# Patient Record
Sex: FEMALE | Race: WHITE | NOT HISPANIC OR LATINO | Employment: FULL TIME | URBAN - METROPOLITAN AREA
[De-identification: names, ages, dates, MRNs, and addresses within clinical notes are randomized per-mention and may not be internally consistent; named-entity substitution may affect disease eponyms.]

---

## 2017-01-12 ENCOUNTER — APPOINTMENT (OUTPATIENT)
Dept: PHYSICAL THERAPY | Facility: REHABILITATION | Age: 47
End: 2017-01-12
Payer: COMMERCIAL

## 2017-01-12 PROCEDURE — 97140 MANUAL THERAPY 1/> REGIONS: CPT

## 2017-01-12 PROCEDURE — 97014 ELECTRIC STIMULATION THERAPY: CPT

## 2017-01-13 ENCOUNTER — GENERIC CONVERSION - ENCOUNTER (OUTPATIENT)
Dept: OTHER | Facility: OTHER | Age: 47
End: 2017-01-13

## 2017-01-17 ENCOUNTER — ALLSCRIPTS OFFICE VISIT (OUTPATIENT)
Dept: OTHER | Facility: OTHER | Age: 47
End: 2017-01-17

## 2017-01-17 ENCOUNTER — TRANSCRIBE ORDERS (OUTPATIENT)
Dept: ADMINISTRATIVE | Facility: HOSPITAL | Age: 47
End: 2017-01-17

## 2017-01-17 DIAGNOSIS — R20.0 TACTILE ANESTHESIA: Primary | ICD-10-CM

## 2017-01-18 ENCOUNTER — OFFICE VISIT (OUTPATIENT)
Dept: RADIOLOGY | Facility: CLINIC | Age: 47
End: 2017-01-18
Payer: COMMERCIAL

## 2017-01-18 ENCOUNTER — ALLSCRIPTS OFFICE VISIT (OUTPATIENT)
Dept: OTHER | Facility: OTHER | Age: 47
End: 2017-01-18

## 2017-01-18 DIAGNOSIS — R20.0 TACTILE ANESTHESIA: ICD-10-CM

## 2017-01-18 PROCEDURE — 95886 MUSC TEST DONE W/N TEST COMP: CPT

## 2017-01-18 PROCEDURE — 95909 NRV CNDJ TST 5-6 STUDIES: CPT

## 2017-01-19 ENCOUNTER — APPOINTMENT (OUTPATIENT)
Dept: PHYSICAL THERAPY | Facility: REHABILITATION | Age: 47
End: 2017-01-19
Payer: COMMERCIAL

## 2017-01-19 PROCEDURE — 97110 THERAPEUTIC EXERCISES: CPT

## 2017-01-23 ENCOUNTER — APPOINTMENT (OUTPATIENT)
Dept: PHYSICAL THERAPY | Facility: REHABILITATION | Age: 47
End: 2017-01-23
Payer: COMMERCIAL

## 2017-01-23 PROCEDURE — 97110 THERAPEUTIC EXERCISES: CPT

## 2017-01-30 ENCOUNTER — APPOINTMENT (OUTPATIENT)
Dept: PHYSICAL THERAPY | Facility: REHABILITATION | Age: 47
End: 2017-01-30
Payer: COMMERCIAL

## 2017-01-30 PROCEDURE — 97110 THERAPEUTIC EXERCISES: CPT

## 2017-02-06 ENCOUNTER — APPOINTMENT (OUTPATIENT)
Dept: PHYSICAL THERAPY | Facility: REHABILITATION | Age: 47
End: 2017-02-06
Payer: COMMERCIAL

## 2017-02-06 PROCEDURE — 97110 THERAPEUTIC EXERCISES: CPT

## 2017-02-13 ENCOUNTER — APPOINTMENT (OUTPATIENT)
Dept: PHYSICAL THERAPY | Facility: REHABILITATION | Age: 47
End: 2017-02-13
Payer: COMMERCIAL

## 2017-02-13 ENCOUNTER — GENERIC CONVERSION - ENCOUNTER (OUTPATIENT)
Dept: OTHER | Facility: OTHER | Age: 47
End: 2017-02-13

## 2017-02-20 ENCOUNTER — APPOINTMENT (OUTPATIENT)
Dept: PHYSICAL THERAPY | Facility: REHABILITATION | Age: 47
End: 2017-02-20
Payer: COMMERCIAL

## 2017-02-20 PROCEDURE — 97140 MANUAL THERAPY 1/> REGIONS: CPT

## 2017-02-22 ENCOUNTER — GENERIC CONVERSION - ENCOUNTER (OUTPATIENT)
Dept: OTHER | Facility: OTHER | Age: 47
End: 2017-02-22

## 2017-03-16 ENCOUNTER — GENERIC CONVERSION - ENCOUNTER (OUTPATIENT)
Dept: OTHER | Facility: OTHER | Age: 47
End: 2017-03-16

## 2017-04-06 ENCOUNTER — HOSPITAL ENCOUNTER (OUTPATIENT)
Dept: MAMMOGRAPHY | Facility: MEDICAL CENTER | Age: 47
Discharge: HOME | End: 2017-04-06
Payer: COMMERCIAL

## 2017-04-06 DIAGNOSIS — Z12.31 ENCOUNTER FOR SCREENING MAMMOGRAM FOR MALIGNANT NEOPLASM OF BREAST: ICD-10-CM

## 2017-04-06 PROCEDURE — 77063 BREAST TOMOSYNTHESIS BI: CPT

## 2017-04-06 PROCEDURE — G0202 SCR MAMMO BI INCL CAD: HCPCS

## 2017-04-20 ENCOUNTER — APPOINTMENT (OUTPATIENT)
Dept: URGENT CARE | Facility: MEDICAL CENTER | Age: 47
End: 2017-04-20
Payer: OTHER MISCELLANEOUS

## 2017-04-20 PROCEDURE — G0382 LEV 3 HOSP TYPE B ED VISIT: HCPCS

## 2017-04-20 PROCEDURE — 99283 EMERGENCY DEPT VISIT LOW MDM: CPT

## 2017-05-02 ENCOUNTER — APPOINTMENT (OUTPATIENT)
Dept: URGENT CARE | Facility: MEDICAL CENTER | Age: 47
End: 2017-05-02
Payer: OTHER MISCELLANEOUS

## 2017-05-02 PROCEDURE — 99214 OFFICE O/P EST MOD 30 MIN: CPT

## 2017-05-09 ENCOUNTER — GENERIC CONVERSION - ENCOUNTER (OUTPATIENT)
Dept: OTHER | Facility: OTHER | Age: 47
End: 2017-05-09

## 2017-05-25 ENCOUNTER — ALLSCRIPTS OFFICE VISIT (OUTPATIENT)
Dept: OTHER | Facility: OTHER | Age: 47
End: 2017-05-25

## 2017-05-25 DIAGNOSIS — E78.5 HYPERLIPIDEMIA: ICD-10-CM

## 2017-05-25 DIAGNOSIS — Z00.00 ENCOUNTER FOR GENERAL ADULT MEDICAL EXAMINATION WITHOUT ABNORMAL FINDINGS: ICD-10-CM

## 2017-05-26 ENCOUNTER — GENERIC CONVERSION - ENCOUNTER (OUTPATIENT)
Dept: OTHER | Facility: OTHER | Age: 47
End: 2017-05-26

## 2017-08-01 ENCOUNTER — GENERIC CONVERSION - ENCOUNTER (OUTPATIENT)
Dept: OTHER | Facility: OTHER | Age: 47
End: 2017-08-01

## 2017-08-23 ENCOUNTER — GENERIC CONVERSION - ENCOUNTER (OUTPATIENT)
Dept: OTHER | Facility: OTHER | Age: 47
End: 2017-08-23

## 2017-09-12 ENCOUNTER — ALLSCRIPTS OFFICE VISIT (OUTPATIENT)
Dept: OTHER | Facility: OTHER | Age: 47
End: 2017-09-12

## 2017-10-25 ENCOUNTER — GENERIC CONVERSION - ENCOUNTER (OUTPATIENT)
Dept: OTHER | Facility: OTHER | Age: 47
End: 2017-10-25

## 2017-11-08 ENCOUNTER — GENERIC CONVERSION - ENCOUNTER (OUTPATIENT)
Dept: OTHER | Facility: OTHER | Age: 47
End: 2017-11-08

## 2018-01-02 ENCOUNTER — ALLSCRIPTS OFFICE VISIT (OUTPATIENT)
Dept: OTHER | Facility: OTHER | Age: 48
End: 2018-01-02

## 2018-01-03 NOTE — PROGRESS NOTES
Assessment   1  Acute sinusitis (461 9) (J01 90)   2  Cigarette smoker (305 1) (F17 210)    Plan   Acute sinusitis    · DrRx Zithromax Z-Benja 250 MG #6 pill pack; take as directed    Discussion/Summary      Trav Notice is here with symptoms of sinusitis for the past 2 weeks  We will treat with increased water drinking and Mucinex D during the day  She may continue was use of Tylenol and/or ibuprofen for discomfort  I have given her prescription for a Z-Benja  discussed need for smoking cessation and I offered help  She had done well with patches in the fall and hopefully she and her  will determine a quit date in the near future  She should contact us if her symptoms are not resolving  We will plan health maintenance visit in the spring  Chief Complaint   sinus congestion      History of Present Illness   HPI: Cold sx began 2 weeks ago  She started with cold sx with ST and nasal congestion  has tried allergy tabs, Delsym and Mucinex but nothing helps  mostly productive of yellow mucus of sinus pressure in AM nausea from PND   has had congestion but not as sick as she is  quit smoking in October for 2 weeks with patches  to 1 ppd            Review of Systems        Constitutional: feeling tired, but-- no fever,-- not feeling poorly-- and-- no chills  ENT: sore throat, but-- no earache  Cardiovascular: no complaints of slow or fast heart rate, no chest pain, no palpitations, no leg claudication or lower extremity edema  Respiratory: cough, but-- no shortness of breath-- and-- no wheezing  Active Problems   1  Aftercare following surgery (V58 89) (Z48 89)   2  Anxiety (300 00) (F41 9)   3  Cervical cancer screening (V76 2) (Z12 4)   4  Encounter for gynecological examination with abnormal finding (V72 31) (Z01 411)   5  Encounter for routine gynecological examination (V72 31) (Z01 419)   6   Encounter for routine gynecological examination with Papanicolaou smear of cervix (V72 31,V76 2) (Z01 419)   7  Flu vaccine need (V04 81) (Z23)   8  History of self breast exam   9  Hyperlipidemia (272 4) (E78 5)   10  Insomnia (780 52) (G47 00)   11  Irregular menstrual cycle (626 4) (N92 6)   12  Lateral epicondylitis of right elbow (726 32) (M77 11)   13  Medial epicondylitis (726 31) (M77 00)   14  History of Need for prophylactic vaccination and inoculation against influenza (V04 81)      (Z23)   15  Trochanteric bursitis of right hip (726 5) (M70 61)   16  Visit for screening mammogram (V76 12) (Z12 31)    Past Medical History   1  History of Acute cystitis with hematuria (595 0) (N30 01)   2  History of Acute upper respiratory infection (465 9) (J06 9)   3  History of Endometrial polyp (621 0) (N84 0)   4  History of  4 (V22 2) (Z33 1)   5  History of acute sinusitis (V12 69) (Z87 09)   6  History of acute sinusitis (V12 69) (Z87 09)   7  History of allergy (V15 09) (Z88 9)   8  History of depression (V11 8) (Z86 59)   9  History of ovarian cyst (V13 29) (Z87 42)   10  History of urinary tract infection (V13 02) (Z87 440)   11  History of uterine leiomyoma (V13 29) (Z86 018)   12  History of viral gastroenteritis (V12 09) (Z86 19)   13  History of Knee pain, bilateral (719 46) (M25 561,M25 562)   14  History of Need for prophylactic vaccination and inoculation against influenza (V04 81)      (Z23)   15  History of Numbness of arm (782 0) (R20 0)   16  History of Pain, upper back (724 5) (M54 9)   17  History of Previously Pregnant Including ___ Miscarriage(S)   18  History of Sprain of right foot, initial encounter (845 10) (S98 671A)   19  History of Tubal Pregnancy (633 10)  Active Problems And Past Medical History Reviewed: The active problems and past medical history were reviewed and updated today  Family History   Mother    1  Family history of hypertension (V17 49) (Z82 49)  Father    2  Family history of rheumatoid arthritis (V17 7) (Z82 61)  Sister    3   Family history of hypertension (V17 49) (Z82 49)  Maternal Grandmother    4  Family history of lung cancer (V16 1) (Z80 1)  Paternal Grandmother    11  Family history of malignant neoplasm of breast (V16 3) (Z80 3)  Maternal Aunt    6  Family history of malignant neoplasm (V16 9) (Z80 9)    Social History    · Always uses seat belt   · Being A Social Drinker   · rare   · Caffeine use (V49 89) (F15 90)   · Cigarette smoker (305 1) (F17 210)   · Denied: History of Drug use   ·    · No preference on Taoist beliefs   · Two children    Surgical History   1  History of Abdominal Surgery   2  History of Breast Surgery   3  History of  Section   4  History of Elbow Surgery   5  History of Eye Surgery   6  History of Hemorrhoidectomy   7  History of Incisional Breast Biopsy   8  History of Oral Surgery Tooth Extraction    Current Meds    1  Eszopiclone 2 MG Oral Tablet; TAKE 1 TABLET AT BEDTIME AS NEEDED FOR SLEEP; Therapy: 2012 to (Ewa Peppers)  Requested for: 40UGQ9214; Last     Rx:2017 Ordered   2  LORazepam 1 MG Oral Tablet; Take 1 tablet daily; Therapy: 78LQY5635 to (Evaluate:68Qan8252)  Requested for: 2016; Last     Rx:2016 Ordered   3  Multi-Vitamin Daily Oral Tablet; Therapy: (Recorded:2014) to Recorded   4  Nicotine 21 MG/24HR Transdermal Patch 24 Hour; APPLY 1 PATCH DAILY AS     DIRECTED; Therapy: 83WGO6286 to (88 649 24 60)  Requested for: 33MJV0544; Last     Rx:2017 Ordered     The medication list was reviewed and updated today  Allergies   1  Augmentin TABS   2  PHENobarbital TABS    Vitals    Recorded: 2018 02:34PM Recorded: 12PZJ8144 02:11PM   Temperature  94 1 F   Systolic 150    Diastolic 80    Height  5 ft 1 5 in   Weight  161 lb 6 oz   BMI Calculated  30   BSA Calculated  1 73     Physical Exam        Constitutional      General appearance: No acute distress, well appearing and well nourished  overweight        Ears, Nose, Mouth, and Throat      External inspection of ears and nose: Normal        Otoscopic examination: Tympanic membranes translucent with normal light reflex  Canals patent without erythema  Nasal mucosa, septum, and turbinates: Abnormal   The bilateral nasal mucosa was boggy  Oropharynx: Normal with no erythema, edema, exudate or lesions  Pulmonary      Respiratory effort: Abnormal   Respiratory Findings: dry cough  Auscultation of lungs: Clear to auscultation  Cardiovascular      Auscultation of heart: Normal rate and rhythm, normal S1 and S2, without murmurs            Signatures    Electronically signed by : Dahlia Mascorro MD; Jan 2 2018  3:18PM EST                       (Author)

## 2018-01-09 NOTE — MISCELLANEOUS
Message   Recorded as Task   Date: 09/28/2016 10:12 AM, Created By: Laly Holcomb   Task Name: Miscellaneous   Assigned To: Linda Wilkins   Regarding Patient: Shey Samuels, Status: In Progress   Comment:    Eric Yuen - 28 Sep 2016 10:12 AM     TASK CREATED  Labs are normal, please inform the patient  Recommend proceed with plan as scheduled   Stephanie Mason - 28 Sep 2016 10:32 AM     TASK IN PROGRESS        Active Problems    1  Anxiety (300 00) (F41 9)   2  Cervical cancer screening (V76 2) (Z12 4)   3  Depression (311) (F32 9)   4  Encounter for gynecological examination with abnormal finding (V72 31) (Z01 411)   5  Encounter for routine gynecological examination with Papanicolaou smear of cervix   (V72 31,V76 2) (Z01 419)   6  Endometrial polyp (621 0) (N84 0)   7  Fibroids (218 9) (D25 9)   8  Flu vaccine need (V04 81) (Z23)   9  History of self breast exam   10  Insomnia (780 52) (G47 00)   11  Irregular menstrual cycle (626 4) (N92 6)   12  Lateral epicondylitis of right elbow (726 32) (M77 11)   13  Medial epicondylitis (726 31) (M77 00)   14  History of Need for prophylactic vaccination and inoculation against influenza (V04 81)    (Z23)   15  Visit for screening mammogram (V76 12) (Z12 31)    Current Meds   1  Eszopiclone 3 MG Oral Tablet (Lunesta); TAKE 1 TABLET AT BEDTIME; Therapy: 02Apr2012 to (Renew:20Nov2016)  Requested for: 20Jei5154; Last   Rx:02Iir8354 Ordered   2  LORazepam 1 MG Oral Tablet; Take 1 tablet daily; Therapy: 45PZV5125 to (Evaluate:92Jks5277)  Requested for: 19Apr2016; Last   Rx:19Apr2016 Ordered   3  Multi-Vitamin Daily Oral Tablet; Therapy: (Recorded:05Jun2014) to Recorded   4  Naproxen 500 MG Oral Tablet; TAKE 1 TABLET EVERY 12 HOURS WITH FOOD AS   NEEDED; Therapy: 20OZX9723 to (Suraj Aguilar)  Requested for: 03Skc0910; Last   Rx:44Muw9085 Ordered   5  Nicotine 21 MG/24HR Transdermal Patch 24 Hour; APPLY 1 PATCH DAILY AS   DIRECTED;    Therapy: 33TBP8224 to (Evaluate:36Guq0516)  Requested for: 60YUH0607; Last   Rx:41Nbw2607 Ordered   6  Norethindrone Acetate 5 MG Oral Tablet; 1 tablet BID; Therapy: 99FQR4710 to (Evaluate:60Xvr3325)  Requested for: 47NQJ6171; Last   Rx:40Dvj5015 Ordered    Allergies    1  Augmentin TABS   2   PHENobarbital TABS    Signatures   Electronically signed by : Bryan Carmichael, ; Sep 28 2016  1:18PM EST                       (Author)

## 2018-01-10 NOTE — RESULT NOTES
Verified Results  (Q) PAIN MANAGEMENT PROFILE 1 W/ CONFIRMATION, URINE 59OQR2720 12:00AM Brittanie Myriam     Test Name Result Flag Reference   Prescribed Drug 1 Lorazepam     Creatinine 21 5 mg/dL  > or = 20 0   pH 6 07  4 5 - 9 0   Oxidant NEGATIVE mcg/mL  <200   Amphetamines NEGATIVE ng/mL  <500   medMATCH Amphetamines CONSISTENT     Barbiturates NEGATIVE ng/mL  <300   medMATCH Barbiturates CONSISTENT     Benzodiazepines NEGATIVE ng/mL  <100   medMATCH Benzodiazepines INCONSISTENT     Marijuana Metabolite NEGATIVE ng/mL  <20   medMATCH Marijuana Metab CONSISTENT     Cocaine Metabolite NEGATIVE ng/mL  <150   medMATCH Cocaine Metab CONSISTENT     Methadone NEGATIVE ng/mL  <100   medMATCH Methadone CONSISTENT     Opiates NEGATIVE ng/mL  <100   medMATCH Opiates CONSISTENT     Oxycodone NEGATIVE ng/mL  <100   medMATCH Oxycodone CONSISTENT     Phencyclidine NEGATIVE ng/mL  <25   medMATCH Phencyclidine CONSISTENT     This drug testing is for medical treatment only  Analysis was performed as non-forensic testing and   these results should be used only by healthcare   providers to render diagnosis or treatment, or to   monitor progress of medical conditions  [facility] comments are:   - present when drug test results may be the result of      metabolism of one or more drugs or when results are      inconsistent with prescribed medication(s) listed  - may be blank when drug results are consistent with      prescribed medication(s) listed  For assistance with interpreting these drug results,   please contact a [facility]   Specialist: [phone] ([phone]), M-F,   8am-6pm EST

## 2018-01-12 NOTE — MISCELLANEOUS
Message   Recorded as Task   Date: 02/08/2016 01:59 PM, Created By: Angi Barrios   Task Name: Follow Up   Assigned To: Stephanie Mason   Regarding Patient: Ciera Aguilera, Status: Active   Comment:    Angi Barrios - 08 Feb 2016 1:59 PM     TASK CREATED  pt is asking for a 3 month supply through the mail away for the aygestin    I didn't know if this was going to be a long term med for her    Yarelis Spine - 08 Feb 2016 3:35 PM     TASK REPLIED TO: Previously Assigned To [facility]  if it is working, she can continue for another 3 months and then we can re evaluate her  Active Problems    1  Acute sinusitis (461 9) (J01 90)   2  Acute upper respiratory infection (465 9) (J06 9)   3  Anxiety (300 00) (F41 9)   4  Cervical cancer screening (V76 2) (Z12 4)   5  Encounter for gynecological examination with abnormal finding (V72 31) (Z01 411)   6  Fibroids (218 9) (D25 9)   7  History of self breast exam   8  Insomnia (780 52) (G47 00)   9  Irregular menstrual cycle (626 4) (N92 6)   10  History of Need for prophylactic vaccination and inoculation against influenza (V04 81)    (Z23)   11  Tobacco use (305 1) (Z72 0)   12  Visit for screening mammogram (V76 12) (Z12 31)    Current Meds   1  Eszopiclone 3 MG Oral Tablet (Lunesta); TAKE 1 TABLET AT BEDTIME; Therapy: 02Apr2012 to (Marine Rm)  Requested for: 82ZTI6092; Last   Rx:57Iif8908 Ordered   2  Levofloxacin 500 MG Oral Tablet; Take 1 pill by mouth daily; Therapy: 57JZV5194 to (Evaluate:33Zsb2278)  Requested for: 26TUU3777; Last   Rx:60Rqh5690 Ordered   3  LORazepam 1 MG Oral Tablet; Take 1 tablet daily; Therapy: 37GYX2269 to (454 5656)  Requested for: 29KDR1244; Last   Rx:10Bwl7555 Ordered   4  Multi-Vitamin Daily Oral Tablet; Therapy: (Recorded:78Ahf3954) to Recorded   5  Norethindrone Acetate 5 MG Oral Tablet; Take 1 tablet daily; Therapy: 54OYE1362 to (Evaluate:51Kcc1688)  Requested for: 42KAO8400;  Last   Rx:32Kss1044 Ordered    Allergies    1  Augmentin TABS   2   PHENobarbital TABS    Plan  Irregular menstrual cycle    · From  Norethindrone Acetate 5 MG Oral Tablet Take 1 tablet daily To  Norethindrone Acetate 5 MG Oral Tablet TAKE AS DIRECTED    Signatures   Electronically signed by : Anastasiia Granados, ; Feb 8 2016  4:23PM EST                       (Author)

## 2018-01-12 NOTE — MISCELLANEOUS
Message   Recorded as Task   Date: 05/04/2017 04:43 PM, Created By: Karen Encarnacion   Task Name: Call Back   Assigned To: Stacy Mclaughlin,Team   Regarding Patient: Flavia Cleveland, Status: In Progress   Comment:    Tasha Rice - 04 May 2017 4:43 PM     TASK CREATED  Caller: Self; [phone] (Home); [phone] (Work)  She would like a referral to go see Dr Taco Ignacio about her ongoing elbow pain  OK to issue? She will also need ins referral for [facility] whe appt is scheduled  Magdy Thompsonfany - 08 May 2017 6:51 PM     TASK REPLIED TO: Previously Assigned To [facility] can refer her to Dr Taco Ignacio; however, if she needs to get a referral, she will need to come into the office to be seen since it has been a while since she was seen here  Tasha Rice - 08 May 2017 6:57 PM     TASK EDITED  lmovm for pt to call back  Needs referral for [facility] secondary ins  So needs appt here  Tasha Rcie - 08 May 2017 6:57 PM     TASK IN PROGRESS   Sandy Seymour - 09 May 2017 9:23 AM     TASK EDITED  Appt 5/25/17  Active Problems    1  Aftercare following surgery (V58 89) (Z48 89)   2  Anxiety (300 00) (F41 9)   3  Cervical cancer screening (V76 2) (Z12 4)   4  Depression (311) (F32 9)   5  Encounter for gynecological examination with abnormal finding (V72 31) (Z01 411)   6  Encounter for routine gynecological examination with Papanicolaou smear of cervix   (V72 31,V76 2) (Z01 419)   7  Fibroids (218 9) (D25 9)   8  Flu vaccine need (V04 81) (Z23)   9  History of self breast exam   10  Insomnia (780 52) (G47 00)   11  Irregular menstrual cycle (626 4) (N92 6)   12  Lateral epicondylitis of right elbow (726 32) (M77 11)   13  Medial epicondylitis (726 31) (M77 00)   14  History of Need for prophylactic vaccination and inoculation against influenza (V04 81)    (Z23)   15  Numbness of arm (782 0) (R20 0)   16  Visit for screening mammogram (V76 12) (Z12 31)    Current Meds   1   Eszopiclone 3 MG Oral Tablet (Lunesta); TAKE 1 TABLET AT BEDTIME; Therapy: 02Apr2012 to (Mery Torres)  Requested for: 25TYZ5644; Last   Rx:27Jan2017 Ordered   2  LORazepam 1 MG Oral Tablet; Take 1 tablet daily; Therapy: 44TIP5223 to (Evaluate:09Eyf3309)  Requested for: 19Apr2016; Last   Rx:19Apr2016 Ordered   3  Multi-Vitamin Daily Oral Tablet; Therapy: (Recorded:05Jun2014) to Recorded   4  Naproxen 500 MG Oral Tablet Recorded   5  Nicotine 21 MG/24HR Transdermal Patch 24 Hour; APPLY 1 PATCH DAILY AS   DIRECTED; Therapy: 58JAP0483 to (Evaluate:10Aug2016)  Requested for: 86DQV0602; Last   Rx:37Yfc0093 Ordered   6  Norethindrone Acetate 5 MG Oral Tablet; TAKE AS DIRECTED; Therapy: 97UFA9938 to (Evaluate:12Nov2017)  Requested for: 79OUF4713; Last   Rx:17Nov2016 Ordered   7  Percocet  MG Oral Tablet (Oxycodone-Acetaminophen) Recorded   8  Promethazine HCl - 12 5 MG Oral Tablet Recorded    Allergies    1  Augmentin TABS   2   PHENobarbital TABS    Signatures   Electronically signed by : Lance Laurent, ; May  9 2017  9:23AM EST                       (Author)

## 2018-01-13 VITALS
HEIGHT: 62 IN | SYSTOLIC BLOOD PRESSURE: 128 MMHG | HEART RATE: 86 BPM | BODY MASS INDEX: 28.96 KG/M2 | WEIGHT: 157.38 LBS | DIASTOLIC BLOOD PRESSURE: 84 MMHG

## 2018-01-13 VITALS
SYSTOLIC BLOOD PRESSURE: 120 MMHG | WEIGHT: 151.25 LBS | HEIGHT: 62 IN | DIASTOLIC BLOOD PRESSURE: 78 MMHG | BODY MASS INDEX: 27.83 KG/M2

## 2018-01-15 VITALS
SYSTOLIC BLOOD PRESSURE: 116 MMHG | WEIGHT: 153 LBS | TEMPERATURE: 96.5 F | BODY MASS INDEX: 28.16 KG/M2 | DIASTOLIC BLOOD PRESSURE: 90 MMHG | HEART RATE: 80 BPM | HEIGHT: 62 IN

## 2018-01-16 NOTE — MISCELLANEOUS
Message   Recorded as Task   Date: 10/02/2016 05:54 PM, Created By: Andres Giron   Task Name: Go to Result   Assigned To: Karmen Abdullahi   Regarding Patient: Nimesh Menard, Status: In Progress   CommentCurtistine Estimable - 02 Oct 2016 5:54 PM     TASK CREATED  endo biopsy normal, please inform the pt  Proceed with hysteroscopy D&C as scheduled   Eden Hdz - 03 Oct 2016 8:22 AM     TASK IN PROGRESS   Pt informed  Active Problems    1  Anxiety (300 00) (F41 9)   2  Cervical cancer screening (V76 2) (Z12 4)   3  Depression (311) (F32 9)   4  Encounter for gynecological examination with abnormal finding (V72 31) (Z01 411)   5  Encounter for routine gynecological examination with Papanicolaou smear of cervix   (V72 31,V76 2) (Z01 419)   6  Endometrial polyp (621 0) (N84 0)   7  Fibroids (218 9) (D25 9)   8  Flu vaccine need (V04 81) (Z23)   9  History of self breast exam   10  Insomnia (780 52) (G47 00)   11  Irregular menstrual cycle (626 4) (N92 6)   12  Lateral epicondylitis of right elbow (726 32) (M77 11)   13  Medial epicondylitis (726 31) (M77 00)   14  History of Need for prophylactic vaccination and inoculation against influenza (V04 81)    (Z23)   15  Visit for screening mammogram (V76 12) (Z12 31)    Current Meds   1  Eszopiclone 3 MG Oral Tablet (Lunesta); TAKE 1 TABLET AT BEDTIME; Therapy: 02Apr2012 to (Renew:20Nov2016)  Requested for: 21Bpd8436; Last   Rx:93Ykp1285 Ordered   2  LORazepam 1 MG Oral Tablet; Take 1 tablet daily; Therapy: 36YLA0949 to (Evaluate:95Yrd5765)  Requested for: 19Apr2016; Last   Rx:19Apr2016 Ordered   3  Multi-Vitamin Daily Oral Tablet; Therapy: (Recorded:05Jun2014) to Recorded   4  Naproxen 500 MG Oral Tablet; TAKE 1 TABLET EVERY 12 HOURS WITH FOOD AS   NEEDED; Therapy: 35VZB2244 to (Dar Ernst)  Requested for: 08Sep2016; Last   Rx:21Kbr4035 Ordered   5   Nicotine 21 MG/24HR Transdermal Patch 24 Hour; APPLY 1 PATCH DAILY AS   DIRECTED; Therapy: 68HVS0870 to (Evaluate:75Wpo6738)  Requested for: 36TRE0616; Last   Rx:48Ozy7896 Ordered   6  Norethindrone Acetate 5 MG Oral Tablet; 1 tablet BID; Therapy: 12HWT9273 to (Evaluate:88Iiq6271)  Requested for: 49PBF4902; Last   Rx:06Wea3841 Ordered    Allergies    1  Augmentin TABS   2   PHENobarbital TABS    Signatures   Electronically signed by : Micha Rhoades, ; Oct  7 2016 11:46AM EST                       (Author)

## 2018-01-17 NOTE — MISCELLANEOUS
Message   Date: 17 Nov 2016 9:45 AM EST, Recorded By: Stella Leroy For: [place]: Keri Barahona, Self   Phone: [phone] [facility]), [phone] (Work)   Reason: Renew Medication   Patient called for Rx refill, escript sent to [employer]  Active Problems    1  Aftercare following surgery (V58 89) (Z48 89)   2  Anxiety (300 00) (F41 9)   3  Cervical cancer screening (V76 2) (Z12 4)   4  Depression (311) (F32 9)   5  Encounter for gynecological examination with abnormal finding (V72 31) (Z01 411)   6  Encounter for routine gynecological examination with Papanicolaou smear of cervix   (V72 31,V76 2) (Z01 419)   7  Endometrial polyp (621 0) (N84 0)   8  Fibroids (218 9) (D25 9)   9  Flu vaccine need (V04 81) (Z23)   10  History of self breast exam   11  Insomnia (780 52) (G47 00)   12  Irregular menstrual cycle (626 4) (N92 6)   13  Lateral epicondylitis of right elbow (726 32) (M77 11)   14  Medial epicondylitis (726 31) (M77 00)   15  History of Need for prophylactic vaccination and inoculation against influenza (V04 81)    (Z23)   16  Visit for screening mammogram (V76 12) (Z12 31)    Current Meds   1  Eszopiclone 3 MG Oral Tablet (Lunesta); TAKE 1 TABLET AT BEDTIME; Therapy: 02Apr2012 to (Renew:98Rmv0073)  Requested for: 45RXG5983; Last   Rx:14Nov2016 Ordered   2  LORazepam 1 MG Oral Tablet; Take 1 tablet daily; Therapy: 46PFU8295 to (Evaluate:42Fjr7683)  Requested for: 19Apr2016; Last   Rx:19Apr2016 Ordered   3  Multi-Vitamin Daily Oral Tablet; Therapy: (Recorded:05Jun2014) to Recorded   4  Naproxen 500 MG Oral Tablet Recorded   5  Nicotine 21 MG/24HR Transdermal Patch 24 Hour; APPLY 1 PATCH DAILY AS   DIRECTED; Therapy: 05GDU4583 to (Evaluate:10Aug2016)  Requested for: 72BSV5156; Last   Rx:67Usm2406 Ordered   6  Percocet  MG Oral Tablet (Oxycodone-Acetaminophen) Recorded   7  Promethazine HCl - 12 5 MG Oral Tablet Recorded    Allergies    1  Augmentin TABS   2   PHENobarbital TABS    Signatures   Electronically signed by : Chhaya London, ; Nov 17 2016  9:47AM EST                       (Author)

## 2018-01-17 NOTE — PROGRESS NOTES
Assessment    1  Cigarette smoker (305 1) (F17 210)   2  Encounter for preventive health examination (V70 0) (Z00 00)   3  Anxiety (300 00) (F41 9)   4  Insomnia (780 52) (G47 00)   5  Lateral epicondylitis of right elbow (726 32) (M77 11)   6  Hyperlipidemia (272 4) (E78 5)   7  Trochanteric bursitis of right hip (726 5) (M70 61)    Plan  Anxiety, Insomnia    · (Q) PAIN MANAGEMENT PROFILE 1 W/ CONFIRMATION, URINE; Status:Active; Requested for:93Yen3206;   Drug(s): : Lunesta, lorazepam  Health Maintenance, Hyperlipidemia    · (1) LIPID PANEL FASTING W DIRECT LDL REFLEX; Status:Active; Requested  for:01Zyd3207;   Hyperlipidemia    · (1) CBC/PLT/DIFF; Status:Active; Requested for:15Cyw9220;    · (1) COMPREHENSIVE METABOLIC PANEL; Status:Active; Requested for:47Yai1358;    · (1) TSH WITH FT4 REFLEX; Status:Active; Requested for:90Osy7006; Insomnia    · Eszopiclone 2 MG Oral Tablet; TAKE 1 TABLET AT BEDTIME AS NEEDED FOR  SLEEP  Lateral epicondylitis of right elbow    · 2 - Arsalan Pruitt MD, Allison Pineda  (Orthopedic Surgery) Co-Management  *  Status: Hold For -  Scheduling  Requested for: 98VCY9699  Care Summary provided  : Yes  Need for Tdap vaccination    · Adacel 5-2-15 5 LF-MCG/0 5 Intramuscular Suspension  SocHx: Cigarette smoker, Health Maintenance    · You need to quit smoking ; Status:Complete;   Done: 81FOU9263  Trochanteric bursitis of right hip    · Kenalog 40 MG/ML Injection Suspension   · Lidocaine HCl - 2 % Injection Solution   · Joint Bursa Aspiration &/or Injection Major - POC; Status:Complete;   Done: 95QBM3748    Discussion/Summary  health maintenance visit healthy adult female Currently, she eats an adequate diet and has an adequate exercise regimen  cervical cancer screening is current Breast cancer screening: mammogram is current  Colorectal cancer screening: colorectal cancer screening is not indicated  Osteoporosis screening: bone mineral density testing is not indicated   Screening lab work includes labs as above  The immunizations will be given as outlined in the orders  Advice and education were given regarding tobacco cessation  Patient discussion: discussed with the patient  1  Anxiety - the patient will continue with lorazepam as needed  2  Insomnia - she will continue with the Lunesta at bedtime as needed but she will decrease to the 2 mg strength  She signed a contract, urine sample was obtained, and PDMP was checked  3  Lateral epicondylitis of the right elbow - the patient will continue with the occupational therapy treatments at work  She reports getting kinesiology tape applied which seems to be helpful  She was given a referral to see Dr Geovanna Thomas whom she has an appointment with in August 4  Trochanteric bursitis of the right hip - unimproved with oral anti-inflammatories - the patient was given a corticosteroid injection as above  5  Hyperlipidemia - reassess with labs as above  Chief Complaint  Physical   Pt c/o right elbow pain  History of Present Illness  HM, Adult Female: The patient is being seen for a health maintenance evaluation  General Health: The patient's health since the last visit is described as good  She has regular dental visits  She complains of vision problems  Vision care includes wearing glasses, wearing soft contact lenses and having eye examinations 1 times per year  She denies hearing loss  Immunizations status: not up to date  Lifestyle:  She consumes a diverse and healthy diet  (well-balanced and drinks water)   She exercises regularly  (walks about 3 times a week for about 30-60 minutes )   She uses tobacco  Tobacco Use Duration: 1 cigarette pack(s) per day  She denies alcohol use  She denies drug use  Reproductive health:  she reports normal menses  Screening: Cervical cancer screening includes a pap smear performed September 2016  Breast cancer screening includes a mammogram performed April 2017   Colorectal cancer screening includes no previous colonoscopy  Safety elements used: no seat belt, no sunscreen, no smoke detector and no carbon monoxide detector  Risk findings: no domestic violence  HPI: notes that she went to Ortho and had surgery on the right elbow - went back to work end of January but it started bothering her again mid April - works typing - talked to employer but they 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 it again - notes that she had a WC    notes that she has been having right leg pain - thinks it is trochanteric bursitis - can hurt laying on that side or standing - takes a lot of Advil but still bothering her - has tried stretches and walking more    randomly gets stressed - takes Ativan monthly on average    takes Lunesta for sleep - has not had it the last month since she ran out but got 2-3 wakenings a night      Review of Systems    Constitutional: no fever and no chills  Cardiovascular: no chest pain and no palpitations  Respiratory: no shortness of breath, no cough and no wheezing  Gastrointestinal: no nausea, no vomiting and no diarrhea  Genitourinary: no dysuria  Musculoskeletal: no myalgias    The patient presents with complaints of arthralgias (right hip and elbow)  Integumentary: no rashes and no skin lesions  Neurological: no dizziness and no fainting    The patient presents with complaints of headache (thinks she had a migraine a few months ago - thinks her allergies cause her HAs usually - occurs about once a month and resolves with Advil)  Psychiatric: anxiety, but no depression  Hematologic/Lymphatic: no tendency for easy bleeding and no tendency for easy bruising  Active Problems    1  Aftercare following surgery (V58 89) (Z48 89)   2  Anxiety (300 00) (F41 9)   3  Cervical cancer screening (V76 2) (Z12 4)   4  Encounter for gynecological examination with abnormal finding (V72 31) (Z01 411)   5  Encounter for routine gynecological examination with Papanicolaou smear of cervix   (V72 31,V76 2) (Z01 419)   6   Flu vaccine need (V04 81) (Z23)   7  History of self breast exam   8  Insomnia (780 52) (G47 00)   9  Irregular menstrual cycle (626 4) (N92 6)   10  Lateral epicondylitis of right elbow (726 32) (M77 11)   11  Medial epicondylitis (726 31) (M77 00)   12  History of Need for prophylactic vaccination and inoculation against influenza (V04 81)    (Z23)   13   Visit for screening mammogram (V76 12) (Z12 31)    Past Medical History    · History of Acute cystitis with hematuria (595 0) (N30 01)   · History of Acute upper respiratory infection (465 9) (J06 9)   · History of Endometrial polyp (621 0) (N84 0)   · History of  4 (V22 2) (Z33 1)   · History of acute sinusitis (V12 69) (Z87 09)   · History of acute sinusitis (V12 69) (Z87 09)   · History of allergy (V15 09) (Z88 9)   · History of depression (V11 8) (Z86 59)   · History of ovarian cyst (V13 29) (Z87 42)   · History of urinary tract infection (V13 02) (Z87 440)   · History of uterine leiomyoma (V13 29) (Z86 018)   · History of viral gastroenteritis (V12 09) (Z86 19)   · History of Knee pain, bilateral (719 46) (M25 561,M25 562)   · History of Need for prophylactic vaccination and inoculation against influenza (V04 81)  (Z23)   · History of Numbness of arm (782 0) (R20 0)   · History of Pain, upper back (724 5) (M54 9)   · History of Previously Pregnant Including ___ Miscarriage(S)   · History of Sprain of right foot, initial encounter (845 10) (S98 831A)   · History of Tubal Pregnancy (633 10)    Surgical History    · History of Abdominal Surgery   · History of Breast Surgery   · History of  Section   · History of Elbow Surgery   · History of Eye Surgery   · History of Hemorrhoidectomy   · History of Incisional Breast Biopsy   · History of Oral Surgery Tooth Extraction    Family History  Mother    · Family history of hypertension (V17 49) (Z82 49)  Father    · Family history of rheumatoid arthritis (V17 7) (Z82 61)  Sister    · Family history of hypertension (V17 49) (Z82 49)  Maternal Grandmother    · Family history of lung cancer (V16 1) (Z80 1)  Paternal Grandmother    · Family history of malignant neoplasm of breast (V16 3) (Z80 3)  Maternal Aunt    · Family history of malignant neoplasm (V16 9) (Z80 9)    Social History    · Always uses seat belt   · Being A Social Drinker   · rare   · Caffeine use (V49 89) (F15 90)   · Cigarette smoker (305 1) (F17 210)   · Denied: History of Drug use   ·    · No preference on Judaism beliefs   · Two children    Current Meds   1  Eszopiclone 3 MG Oral Tablet; TAKE 1 TABLET AT BEDTIME; Therapy: 02Apr2012 to (Patel Benoit)  Requested for: 44EOO6923; Last   Rx:27Jan2017 Ordered   2  LORazepam 1 MG Oral Tablet; Take 1 tablet daily; Therapy: 35TKQ8870 to (Evaluate:61Jho3201)  Requested for: 19Apr2016; Last   Rx:19Apr2016 Ordered   3  Multi-Vitamin Daily Oral Tablet; Therapy: (Recorded:05Jun2014) to Recorded   4  Naproxen 500 MG Oral Tablet Recorded   5  Nicotine 21 MG/24HR Transdermal Patch 24 Hour; APPLY 1 PATCH DAILY AS   DIRECTED; Therapy: 04VOK5954 to (Evaluate:10Aug2016)  Requested for: 57GBA5135; Last   Rx:79Smb9827 Ordered    Allergies    1  Augmentin TABS   2  PHENobarbital TABS    Vitals   Recorded: 02OPH2928 06:10PM Recorded: 03BRP5870 05:17PM   Temperature  96 5 F   Heart Rate  80   Systolic 436,    Diastolic 90, LUE 90   Height  5 ft 1 5 in   Weight  153 lb    BMI Calculated  28 44   BSA Calculated  1 7     Physical Exam    Constitutional   General appearance: No acute distress, well appearing and well nourished  Head and Face   Head and face: Normal     Eyes   Conjunctiva and lids: No swelling, erythema or discharge  Pupils and irises: Equal, round, reactive to light  Ears, Nose, Mouth, and Throat   External inspection of ears and nose: Normal     Otoscopic examination: Tympanic membranes translucent with normal light reflex  Canals patent without erythema      Hearing: Normal     Nasal mucosa, septum, and turbinates: Normal without edema or erythema  Lips, teeth, and gums: Normal, good dentition  Oropharynx: Normal with no erythema, edema, exudate or lesions  Neck   Neck: Supple, symmetric, trachea midline, no masses  Thyroid: Normal, no thyromegaly  Pulmonary   Respiratory effort: No increased work of breathing or signs of respiratory distress  Auscultation of lungs: Clear to auscultation  Cardiovascular   Auscultation of heart: Normal rate and rhythm, normal S1 and S2, no murmurs  Peripheral vascular exam: Normal   Carotid: no bruit on the right and no bruit on the left  Radial: right 2+ and left 2+  Posterior tibialis: right 2+ and left 2+  Examination of extremities for edema and/or varicosities: Normal   no edema  Abdomen   Abdomen: Non-tender, no masses  Liver and spleen: No hepatomegaly or splenomegaly  Lymphatic   Palpation of lymph nodes in neck: No lymphadenopathy  Musculoskeletal   Gait and station: Normal     Joints, bones, and muscles: Abnormal   Tenderness to palpation over the lateral right hip without any erythema/warmth/swelling/ecchymosis noted, no tenderness to palpation of the right elbow but pain in the lateral right elbow with resisted pronation  Range of motion: Normal     Muscle strength/tone: Normal   Motor Strength Findings: normal upper extremity strength and normal lower extremity strength  Skin   Skin and subcutaneous tissue: Normal without rashes or lesions  Neurologic   Sensation: No sensory loss  Sensory exam: intact to light touch  Psychiatric   Mood and affect: Normal        Results/Data  PHQ-9 Adult Depression Screening 53OJP8447 05:28PM User, Tangerine Powers     Test Name Result Flag Reference   PHQ-9 Adult Depression Score 3     Over the last two weeks, how often have you been bothered by any of the following problems?   Little interest or pleasure in doing things: Not at all - 0  Feeling down, depressed, or hopeless: Not at all - 0  Trouble falling or staying asleep, or sleeping too much: More than half the days - 2  Feeling tired or having little energy: Several days - 1  Poor appetite or over eating: Not at all - 0  Feeling bad about yourself - or that you are a failure or have let yourself or your family down: Not at all - 0  Trouble concentrating on things, such as reading the newspaper or watching television: Not at all - 0  Moving or speaking so slowly that other people could have noticed   Or the opposite -  being so fidgety or restless that you have been moving around a lot more than usual: Not at all - 0  Thoughts that you would be better off dead, or of hurting yourself in some way: Not at all - 0   PHQ-9 Adult Depression Screening Negative     PHQ-9 Difficulty Level Not difficult at all     PHQ-9 Severity Minimal Depression         Future Appointments    Date/Time Provider Specialty Site   11/27/2017 05:40 PM Eric Anderson, [facility]   Electronically signed by : Liliane Burgos, [facility]; May 25 2017  6:38PM EST                       (Author)    Electronically signed by : SHO Rodriguez ; May 26 2017  4:42PM EST

## 2018-01-23 VITALS
BODY MASS INDEX: 29.7 KG/M2 | SYSTOLIC BLOOD PRESSURE: 118 MMHG | WEIGHT: 161.38 LBS | TEMPERATURE: 98.8 F | HEIGHT: 62 IN | DIASTOLIC BLOOD PRESSURE: 80 MMHG

## 2018-04-06 DIAGNOSIS — Z12.31 ENCOUNTER FOR SCREENING MAMMOGRAM FOR MALIGNANT NEOPLASM OF BREAST: ICD-10-CM

## 2018-04-11 ENCOUNTER — TELEPHONE (OUTPATIENT)
Dept: OBGYN CLINIC | Facility: CLINIC | Age: 48
End: 2018-04-11

## 2018-04-19 DIAGNOSIS — G47.00 INSOMNIA, UNSPECIFIED TYPE: Primary | ICD-10-CM

## 2018-04-19 RX ORDER — ESZOPICLONE 3 MG/1
3 TABLET, FILM COATED ORAL
Qty: 90 TABLET | Refills: 0 | Status: SHIPPED | OUTPATIENT
Start: 2018-04-19 | End: 2018-07-16 | Stop reason: SDUPTHER

## 2018-07-06 ENCOUNTER — HOSPITAL ENCOUNTER (OUTPATIENT)
Dept: MAMMOGRAPHY | Facility: MEDICAL CENTER | Age: 48
Discharge: HOME | End: 2018-07-06
Payer: COMMERCIAL

## 2018-07-06 DIAGNOSIS — Z12.31 ENCOUNTER FOR SCREENING MAMMOGRAM FOR MALIGNANT NEOPLASM OF BREAST: ICD-10-CM

## 2018-07-06 PROCEDURE — 77067 SCR MAMMO BI INCL CAD: CPT

## 2018-07-06 PROCEDURE — 77063 BREAST TOMOSYNTHESIS BI: CPT

## 2018-07-10 ENCOUNTER — OFFICE VISIT (OUTPATIENT)
Dept: FAMILY MEDICINE CLINIC | Facility: CLINIC | Age: 48
End: 2018-07-10
Payer: COMMERCIAL

## 2018-07-10 VITALS
HEART RATE: 77 BPM | OXYGEN SATURATION: 97 % | HEIGHT: 61 IN | RESPIRATION RATE: 16 BRPM | TEMPERATURE: 98 F | BODY MASS INDEX: 28.66 KG/M2 | DIASTOLIC BLOOD PRESSURE: 72 MMHG | WEIGHT: 151.8 LBS | SYSTOLIC BLOOD PRESSURE: 110 MMHG

## 2018-07-10 DIAGNOSIS — E78.5 HYPERLIPIDEMIA, UNSPECIFIED HYPERLIPIDEMIA TYPE: ICD-10-CM

## 2018-07-10 DIAGNOSIS — Z00.00 ENCOUNTER FOR HEALTH MAINTENANCE EXAMINATION IN ADULT: Primary | ICD-10-CM

## 2018-07-10 DIAGNOSIS — R20.2 NOTALGIA PARESTHETICA: ICD-10-CM

## 2018-07-10 DIAGNOSIS — Z12.11 SCREENING FOR COLON CANCER: ICD-10-CM

## 2018-07-10 DIAGNOSIS — F17.200 CURRENT EVERY DAY SMOKER: ICD-10-CM

## 2018-07-10 DIAGNOSIS — G47.00 INSOMNIA, UNSPECIFIED TYPE: ICD-10-CM

## 2018-07-10 PROCEDURE — 99396 PREV VISIT EST AGE 40-64: CPT | Performed by: FAMILY MEDICINE

## 2018-07-10 RX ORDER — NAPROXEN 500 MG/1
TABLET ORAL
Refills: 0 | COMMUNITY
Start: 2018-06-29 | End: 2018-09-26

## 2018-07-10 RX ORDER — METHOCARBAMOL 500 MG/1
500 TABLET, FILM COATED ORAL 3 TIMES DAILY
COMMUNITY
Start: 2018-06-29 | End: 2018-09-26

## 2018-07-10 NOTE — PROGRESS NOTES
Assessment/Plan:    Hyperlipidemia  Have given her a lab slip to check fasting lipid profile and comprehensive metabolic profile  Notalgia paresthetica  I will give her a prescription to obtain a TENS unit  Also recommended use of capsaicin cream     Insomnia  She uses Lunesta nightly which works well  Current every day smoker  Urged patient to quit and offered help  She would like to try patches again  Diagnoses and all orders for this visit:    Encounter for health maintenance examination in adult    Screening for colon cancer    Hyperlipidemia, unspecified hyperlipidemia type    Notalgia paresthetica    Insomnia, unspecified type    Current every day smoker    Other orders  -     methocarbamol (ROBAXIN) 500 mg tablet; Take 500 mg by mouth Three times a day  -     naproxen (NAPROSYN) 500 mg tablet; TAKE 1 TABLET (500 MG TOTAL) BY MOUTH 2 (TWO) TIMES A DAY WITH MEALS FOR 15 DAYS  TAKE WITH MEALS  Subjective:      Patient ID: Agustín Lam is a 50 y o  female  She is here for health maintenance physical exam   She eats healthy diet  Exercises at home with yoga, weights, aerobic  UTD with dentist  UTD with mamm and gyn  Smoking 1 ppd  Works for [employer] service  Lives with  and 15 yr old daughter        The following portions of the patient's history were reviewed and updated as appropriate: allergies, current medications, past family history, past medical history, past social history, past surgical history and problem list     Review of Systems   Constitutional: Negative for activity change, chills, fatigue and fever  HENT: Negative for congestion, ear pain, sinus pressure and sore throat  Eyes: Negative for pain and visual disturbance  Respiratory: Negative for cough, chest tightness, shortness of breath and wheezing  Cardiovascular: Negative for chest pain, palpitations and leg swelling     Gastrointestinal: Negative for abdominal pain, blood in stool, constipation, diarrhea, nausea and vomiting  Endocrine: Negative for polydipsia and polyuria  Genitourinary: Negative for difficulty urinating, dysuria, frequency and urgency  Musculoskeletal: Positive for myalgias  Negative for arthralgias and joint swelling  Spasms upper back  Taking Robaxin from urgent care   Skin: Negative for rash  Neurological: Negative for dizziness, weakness, numbness and headaches  Hematological: Negative for adenopathy  Does not bruise/bleed easily  Psychiatric/Behavioral: Negative for dysphoric mood  The patient is not nervous/anxious  Objective:      /72 (BP Location: Left arm, Patient Position: Sitting, Cuff Size: Standard)   Pulse 77   Temp 98 °F (36 7 °C) (Tympanic)   Resp 16   Ht 5' 1" (1 549 m)   Wt 68 9 kg (151 lb 12 8 oz)   SpO2 97%   BMI 28 68 kg/m²          Physical Exam   Constitutional: She is oriented to person, place, and time  She appears well-developed and well-nourished  HENT:   Head: Normocephalic and atraumatic  Right Ear: External ear normal    Left Ear: External ear normal    Mouth/Throat: Oropharynx is clear and moist    Neck: Normal range of motion  Neck supple  Cardiovascular: Normal rate, regular rhythm and normal heart sounds  Pulmonary/Chest: Effort normal and breath sounds normal    Abdominal: Soft  She exhibits no distension and no mass  There is no tenderness  Musculoskeletal: Normal range of motion  Neurological: She is alert and oriented to person, place, and time  She has normal reflexes  Skin: Skin is warm and dry  The hyperpigmented patch left infra scapular region  Psychiatric: She has a normal mood and affect  Her behavior is normal    Nursing note and vitals reviewed

## 2018-07-16 DIAGNOSIS — G47.00 INSOMNIA, UNSPECIFIED TYPE: ICD-10-CM

## 2018-07-16 RX ORDER — ESZOPICLONE 3 MG/1
3 TABLET, FILM COATED ORAL
Qty: 90 TABLET | Refills: 0 | Status: SHIPPED | OUTPATIENT
Start: 2018-07-16 | End: 2018-07-27 | Stop reason: SDUPTHER

## 2018-07-27 DIAGNOSIS — G47.00 INSOMNIA, UNSPECIFIED TYPE: ICD-10-CM

## 2018-07-27 RX ORDER — ESZOPICLONE 3 MG/1
3 TABLET, FILM COATED ORAL
Qty: 90 TABLET | Refills: 0 | Status: SHIPPED | OUTPATIENT
Start: 2018-07-27 | End: 2019-02-28 | Stop reason: SDUPTHER

## 2018-07-27 NOTE — TELEPHONE ENCOUNTER
SINDY for patient to call office back regarding her prescription    Patient called back and asked that [facility] prescription of Lunesta 3 mg be cancelled and be ordered via [facility]  I called [facility] pharmacy [id number] [place] ([phone]) and spoke to [facility], who deleted Lunesta prescription      Prescription will be ordered via [facility]

## 2018-09-18 ENCOUNTER — OFFICE VISIT (OUTPATIENT)
Dept: FAMILY MEDICINE CLINIC | Facility: CLINIC | Age: 48
End: 2018-09-18
Payer: COMMERCIAL

## 2018-09-18 VITALS
HEART RATE: 82 BPM | WEIGHT: 152.13 LBS | RESPIRATION RATE: 18 BRPM | TEMPERATURE: 97.4 F | HEIGHT: 61 IN | SYSTOLIC BLOOD PRESSURE: 102 MMHG | DIASTOLIC BLOOD PRESSURE: 84 MMHG | BODY MASS INDEX: 28.72 KG/M2 | OXYGEN SATURATION: 97 %

## 2018-09-18 DIAGNOSIS — J01.00 ACUTE MAXILLARY SINUSITIS, RECURRENCE NOT SPECIFIED: Primary | ICD-10-CM

## 2018-09-18 PROCEDURE — 99213 OFFICE O/P EST LOW 20 MIN: CPT | Performed by: PHYSICIAN ASSISTANT

## 2018-09-18 RX ORDER — DOXYCYCLINE 100 MG/1
100 TABLET ORAL 2 TIMES DAILY
Qty: 20 TABLET | Refills: 0 | Status: SHIPPED | OUTPATIENT
Start: 2018-09-18 | End: 2018-09-21

## 2018-09-18 RX ORDER — FLUTICASONE PROPIONATE 50 MCG
2 SPRAY, SUSPENSION (ML) NASAL DAILY
Qty: 16 G | Refills: 0 | Status: SHIPPED | OUTPATIENT
Start: 2018-09-18 | End: 2019-01-09

## 2018-09-18 RX ORDER — BENZONATATE 100 MG/1
100-200 CAPSULE ORAL 3 TIMES DAILY PRN
Qty: 30 CAPSULE | Refills: 0 | Status: SHIPPED | OUTPATIENT
Start: 2018-09-18 | End: 2018-09-26

## 2018-09-18 NOTE — LETTER
September 18, 2018     Patient: Von Laguna   YOB: 1970   Date of Visit: 9/18/2018       To Whom it May Concern:    Von Laguna is under my professional care  She was seen in my office on 9/18/2018  Kera Kapadia She may return to work on 09/19/2018  Please excuse her absence from 09/14/2018- 09/18/2018    If you have any questions or concerns, please don't hesitate to call           Sincerely,          Casey Thomas PA-C        CC: No Recipients

## 2018-09-18 NOTE — PATIENT INSTRUCTIONS
I reviewed with the patient that does appear that she has a an acute sinus infection  She was started on doxycycline 100 milligrams twice daily as she has had her symptoms for a week without any improvement yet  She was additionally encouraged use Flonase 2 sprays per nostril daily  She was given benzonatate to use as needed for cough suppression  She was encouraged to have good fluid intake and rest   She should call if her symptoms are worsening or failing to improve over the next few days

## 2018-09-18 NOTE — PROGRESS NOTES
[facility]  FAMILY PRACTICE ACUTE OFFICE VISIT       NAME: Judy Martinez  AGE: 50 y o  SEX: female       : 1970        MRN: [id number]    DATE: 2018  TIME: 10:52 AM    Assessment and Plan     Problem List Items Addressed This Visit     None      Visit Diagnoses     Acute maxillary sinusitis, recurrence not specified    -  Primary    Relevant Medications    fluticasone (FLONASE) 50 mcg/act nasal spray    doxycycline (ADOXA) 100 MG tablet    benzonatate (TESSALON PERLES) 100 mg capsule        Patient Instructions    I reviewed with the patient that does appear that she has a an acute sinus infection  She was started on doxycycline 100 milligrams twice daily as she has had her symptoms for a week without any improvement yet  She was additionally encouraged use Flonase 2 sprays per nostril daily  She was given benzonatate to use as needed for cough suppression  She was encouraged to have good fluid intake and rest   She should call if her symptoms are worsening or failing to improve over the next few days  Chief Complaint     Chief Complaint   Patient presents with    URI     chest congestion, coughing, possible fever for a week        History of Present Illness   Judy Martinez is a 50y o -year-old female who presents for cold symptoms  URI    This is a new (notes that her daughter had a day of congestion the week prior and now  sick with same symptoms) problem  Episode onset: about 1 week ago  The problem has been unchanged  Maximum temperature: possible fever but not checked  Associated symptoms include congestion, coughing (productive), diarrhea, headaches, rhinorrhea, a sore throat (began with that last week) and wheezing  Pertinent negatives include no ear pain (but has a lot of crackling), nausea or vomiting  Treatments tried: decongestant and Tylenol and then changed to ibuprofen and Theraflu and loratadine  The treatment provided mild relief  Review of Systems   Review of Systems   Constitutional: Positive for appetite change (decreased)  Negative for fever  HENT: Positive for congestion, postnasal drip, rhinorrhea, sinus pressure and sore throat (began with that last week)  Negative for ear pain (but has a lot of crackling)  Respiratory: Positive for cough (productive) and wheezing  Negative for chest tightness and shortness of breath  Gastrointestinal: Positive for diarrhea  Negative for nausea and vomiting  Neurological: Positive for light-headedness and headaches  Active Problem List     Patient Active Problem List   Diagnosis    Anxiety    Hyperlipidemia    Insomnia    Lateral epicondylitis of right elbow    Notalgia paresthetica    Current every day smoker         Social History:  Social History     Social History    Marital status: /Civil Union     Spouse name: N/A    Number of children: N/A    Years of education: N/A     Occupational History    Not on file  Social History Main Topics    Smoking status: Current Every Day Smoker     Packs/day: 1 00     Years: 4 00    Smokeless tobacco: Never Used      Comment: quit for 20yrs but started smoking again 4yr ago    Alcohol use Yes      Comment: 1x week    Drug use: No    Sexual activity: Not on file     Other Topics Concern    Not on file     Social History Narrative    No narrative on file       Objective     Vitals:    09/18/18 1008   BP: 102/84   BP Location: Left arm   Patient Position: Sitting   Cuff Size: Standard   Pulse: 82   Resp: 18   Temp: (!) 97 4 °F (36 3 °C)   SpO2: 97%   Weight: 69 kg (152 lb 2 oz)   Height: 5' 1" (1 549 m)     Wt Readings from Last 3 Encounters:   09/18/18 69 kg (152 lb 2 oz)   07/10/18 68 9 kg (151 lb 12 8 oz)   01/02/18 73 2 kg (161 lb 6 1 oz)       Physical Exam   Constitutional: She appears well-developed and well-nourished  No distress  HENT:   Head: Normocephalic and atraumatic     Right Ear: Tympanic membrane, external ear and ear canal normal    Left Ear: Tympanic membrane, external ear and ear canal normal    Nose: Mucosal edema (severe bilaterally) present  Right sinus exhibits maxillary sinus tenderness  Right sinus exhibits no frontal sinus tenderness  Left sinus exhibits no maxillary sinus tenderness and no frontal sinus tenderness  Neck: Neck supple  No thyromegaly present  Cardiovascular: Normal rate, regular rhythm, normal heart sounds and intact distal pulses  No murmur heard  Pulmonary/Chest: Effort normal and breath sounds normal  She has no wheezes  She has no rales  Frequent dry cough in exam   Lymphadenopathy:     She has no cervical adenopathy  Psychiatric: She has a normal mood and affect  Her behavior is normal    Vitals reviewed  ALLERGIES:  Allergies   Allergen Reactions    Augmentin [Amoxicillin-Pot Clavulanate] GI Intolerance     md harris's use of cefazolin for 0R on 10/17/16 ( Dr Juju Guzman)   [facility] Phenobarbital Other (See Comments)     hallucinations       Current Medications     Current Outpatient Prescriptions   Medication Sig Dispense Refill    acetaminophen (TYLENOL) 500 mg tablet Take 500-1,000 mg by mouth every 6 (six) hours as needed for mild pain        eszopiclone (LUNESTA) tablet Take 1 tablet (3 mg total) by mouth daily at bedtime Take immediately before bedtime 90 tablet 0    LORazepam (ATIVAN) 1 mg tablet Take 0 5 mg by mouth daily as needed        methocarbamol (ROBAXIN) 500 mg tablet Take 500 mg by mouth Three times a day      Multiple Vitamin (MULTI VITAMIN DAILY PO) Take 3 tablets by mouth daily gummies       naproxen (NAPROSYN) 500 mg tablet TAKE 1 TABLET (500 MG TOTAL) BY MOUTH 2 (TWO) TIMES A DAY WITH MEALS FOR 15 DAYS   TAKE WITH MEALS   0    benzonatate (TESSALON PERLES) 100 mg capsule Take 1-2 capsules (100-200 mg total) by mouth 3 (three) times a day as needed for cough 30 capsule 0    doxycycline (ADOXA) 100 MG tablet Take 1 tablet (100 mg total) by mouth 2 (two) times a day for 10 days 20 tablet 0    fluticasone (FLONASE) 50 mcg/act nasal spray 2 sprays into each nostril daily 16 g 0     No current facility-administered medications for this visit            Natacha Alberto PA-C  9/18/2018 10:52 AM  [facility]

## 2018-09-18 NOTE — LETTER
September 18, 2018     Patient: Russ Pierre   YOB: 1970   Date of Visit: 9/18/2018       To Whom it May Concern:    Russ Pierre is under my professional care  She was seen in my office on 9/18/2018  She may return to work on 9/19/18  Please excuse her absence from from 9/14-18/18  If you have any questions or concerns, please don't hesitate to call           Sincerely,          Radha Mariee PA-C        CC: No Recipients

## 2018-09-20 ENCOUNTER — TELEPHONE (OUTPATIENT)
Dept: FAMILY MEDICINE CLINIC | Facility: CLINIC | Age: 48
End: 2018-09-20

## 2018-09-20 DIAGNOSIS — J01.00 ACUTE MAXILLARY SINUSITIS, RECURRENCE NOT SPECIFIED: Primary | ICD-10-CM

## 2018-09-20 DIAGNOSIS — J01.90 ACUTE SINUSITIS, RECURRENCE NOT SPECIFIED, UNSPECIFIED LOCATION: ICD-10-CM

## 2018-09-20 RX ORDER — PROMETHAZINE HYDROCHLORIDE AND CODEINE PHOSPHATE 6.25; 1 MG/5ML; MG/5ML
5 SYRUP ORAL EVERY 4 HOURS PRN
Qty: 120 ML | Refills: 0 | Status: SHIPPED | OUTPATIENT
Start: 2018-09-20 | End: 2018-09-26 | Stop reason: SDUPTHER

## 2018-09-20 NOTE — TELEPHONE ENCOUNTER
Patient call today, c/o coughing is not getting better, Benzonatate 100 mg medication  is helping during day time but not at bedtime  She would like to know if you can prescribe cough syrup phenergan  With codeine  Please advise  PDM checked, is all ok, she is only getting Lunesta

## 2018-09-21 RX ORDER — LEVOFLOXACIN 500 MG/1
500 TABLET, FILM COATED ORAL EVERY 24 HOURS
Qty: 7 TABLET | Refills: 0 | Status: SHIPPED | OUTPATIENT
Start: 2018-09-21 | End: 2018-09-28

## 2018-09-21 NOTE — TELEPHONE ENCOUNTER
Spoke with patient,  She received her cough syrup, and she is aware that she should not be taking this medication with lunesta  Pt still feeling sick she thinks the antibiotic is not working, she mention that you were going to order something different, she would like to know if you can sent a new antibiotic for her

## 2018-09-26 ENCOUNTER — OFFICE VISIT (OUTPATIENT)
Dept: FAMILY MEDICINE CLINIC | Facility: CLINIC | Age: 48
End: 2018-09-26
Payer: COMMERCIAL

## 2018-09-26 VITALS
TEMPERATURE: 98.3 F | HEART RATE: 83 BPM | OXYGEN SATURATION: 95 % | SYSTOLIC BLOOD PRESSURE: 100 MMHG | DIASTOLIC BLOOD PRESSURE: 80 MMHG | WEIGHT: 153.2 LBS | BODY MASS INDEX: 28.92 KG/M2 | HEIGHT: 61 IN

## 2018-09-26 DIAGNOSIS — F17.200 CURRENT EVERY DAY SMOKER: ICD-10-CM

## 2018-09-26 DIAGNOSIS — J06.9 UPPER RESPIRATORY TRACT INFECTION, UNSPECIFIED TYPE: Primary | ICD-10-CM

## 2018-09-26 DIAGNOSIS — J01.00 ACUTE MAXILLARY SINUSITIS, RECURRENCE NOT SPECIFIED: ICD-10-CM

## 2018-09-26 DIAGNOSIS — F41.9 ANXIETY: ICD-10-CM

## 2018-09-26 PROCEDURE — 3008F BODY MASS INDEX DOCD: CPT | Performed by: FAMILY MEDICINE

## 2018-09-26 PROCEDURE — 99214 OFFICE O/P EST MOD 30 MIN: CPT | Performed by: FAMILY MEDICINE

## 2018-09-26 RX ORDER — LORAZEPAM 1 MG/1
0.5 TABLET ORAL DAILY PRN
Qty: 90 TABLET | Refills: 0 | Status: SHIPPED | OUTPATIENT
Start: 2018-09-26

## 2018-09-26 RX ORDER — PROMETHAZINE HYDROCHLORIDE AND CODEINE PHOSPHATE 6.25; 1 MG/5ML; MG/5ML
5 SYRUP ORAL EVERY 4 HOURS PRN
Qty: 120 ML | Refills: 0 | Status: SHIPPED | OUTPATIENT
Start: 2018-09-26 | End: 2019-01-09

## 2018-09-26 NOTE — PROGRESS NOTES
Assessment/Plan:     I believe that the symptoms are related to viral infection, not bacterial sinusitis since she had no improvement with 2 different antibiotics  I reassured her that her lungs are completely clear  I recommended symptomatic treatment with increased water intake along with Mucinex D in the morning  I refilled her prescription for Phenergan with codeine for nighttime coughing  I urged her to try to quit smoking completely  She should contact us if symptoms do not resolve  Anxiety  She manages anxiety with occasional use of lorazepam 0 5 mg   was checked and she was given a 90 day prescription for her mail-order plan  Current every day smoker  Urged her to quit smoking and offered help  Diagnoses and all orders for this visit:    Upper respiratory tract infection, unspecified type    Acute maxillary sinusitis, recurrence not specified  -     promethazine-codeine (PHENERGAN WITH CODEINE) 6 25-10 mg/5 mL syrup; Take 5 mL by mouth every 4 (four) hours as needed for cough    Anxiety    Current every day smoker          Subjective:      Patient ID: Juan Francisco Veloz is a 50 y o  female  She developed cold symptoms and cough about 2 weeks ago  She was started on doxycycline but felt no better so antibiotic was changed to Levaquin  She has 2 more days of Levaquin but still does not feel like she is recovering  She had low-grade fever at the very beginning but then this resolved  She has nasal congestion, postnasal drainage and cough   She does not feel like the Mucinex has been helpful anymore  The promethazine with codeine did help her to sleep  She is leaving on a trip to [place] for a friend's wedding in 2 days  Sometimes the cough is productive of yellowish are clear mucus  Occasionally the cough is dry  Unfortunately she is still smoking, but not as much because of the symptoms  Cough   The current episode started 1 to 4 weeks ago  The problem has been unchanged  Associated symptoms include headaches  Pertinent negatives include no chills, ear pain, fever or sore throat  The following portions of the patient's history were reviewed and updated as appropriate: allergies, current medications, past family history, past medical history, past social history, past surgical history and problem list     Review of Systems   Constitutional: Positive for fatigue  Negative for chills and fever  HENT: Positive for congestion  Negative for ear pain and sore throat  Respiratory: Positive for cough  Gastrointestinal: Positive for diarrhea and nausea  Neurological: Positive for headaches  Objective:      /80 (BP Location: Left arm, Patient Position: Sitting, Cuff Size: Standard)   Pulse 83   Temp 98 3 °F (36 8 °C) (Tympanic)   Ht 5' 1" (1 549 m)   Wt 69 5 kg (153 lb 3 2 oz)   SpO2 95%   BMI 28 95 kg/m²          Physical Exam   Constitutional: She is oriented to person, place, and time  She appears well-developed and well-nourished  HENT:   Head: Normocephalic and atraumatic  Mouth/Throat: Oropharynx is clear and moist    Nose with boggy mucous membranes and clear rhinorrhea   Neck: Normal range of motion  Neck supple  No thyromegaly present  Cardiovascular: Normal rate, regular rhythm and normal heart sounds  Pulmonary/Chest: Effort normal and breath sounds normal    Lymphadenopathy:     She has no cervical adenopathy  Neurological: She is alert and oriented to person, place, and time  Skin: Skin is warm and dry  Nursing note and vitals reviewed

## 2018-09-26 NOTE — ASSESSMENT & PLAN NOTE
She manages anxiety with occasional use of lorazepam 0 5 mg   was checked and she was given a 90 day prescription for her mail-order plan

## 2018-09-26 NOTE — LETTER
September 26, 2018     Patient: Lazara Willard   YOB: 1970   Date of Visit: 9/26/2018       To Whom it May Concern:    Lazara Willard is under my professional care  She was seen in my office on 9/26/2018 for illness which began 9/14/18  Patient is unable to attend work on 9/26/18 and 9/27/18 due to persistent illness  She may return to work on 9/28/18  If you have any questions or concerns, please don't hesitate to call           Sincerely,          Esther Chowdhury MD        CC: No Recipients

## 2019-01-09 ENCOUNTER — OFFICE VISIT (OUTPATIENT)
Dept: FAMILY MEDICINE CLINIC | Facility: CLINIC | Age: 49
End: 2019-01-09
Payer: COMMERCIAL

## 2019-01-09 VITALS
DIASTOLIC BLOOD PRESSURE: 80 MMHG | HEART RATE: 75 BPM | HEIGHT: 61 IN | TEMPERATURE: 97.6 F | WEIGHT: 160 LBS | SYSTOLIC BLOOD PRESSURE: 114 MMHG | BODY MASS INDEX: 30.21 KG/M2 | OXYGEN SATURATION: 95 % | RESPIRATION RATE: 14 BRPM

## 2019-01-09 DIAGNOSIS — H93.13 TINNITUS OF BOTH EARS: ICD-10-CM

## 2019-01-09 DIAGNOSIS — H65.03 BILATERAL ACUTE SEROUS OTITIS MEDIA, RECURRENCE NOT SPECIFIED: Primary | ICD-10-CM

## 2019-01-09 PROCEDURE — 99213 OFFICE O/P EST LOW 20 MIN: CPT | Performed by: FAMILY MEDICINE

## 2019-01-09 PROCEDURE — 3008F BODY MASS INDEX DOCD: CPT | Performed by: FAMILY MEDICINE

## 2019-01-09 NOTE — PROGRESS NOTES
Assessment/Plan:    She appears to have serous otitis media and tenderness  We will treat with Mucinex D and increased hydration with water  Also recommended use of Flonase nasal spray, 2 sprays each nostril daily  She should contact us if symptoms are not improving by next week  Diagnoses and all orders for this visit:    Bilateral acute serous otitis media, recurrence not specified    Tinnitus of both ears          Subjective:      Patient ID: Marty Gifford is a 46 y o  female  Headaches across forehead and tinnitus for 2 weeks  She has felt a lot of nasal congestion, but does not think she had upper respiratory infection that started the symptoms  She has been taking Mucinex and she feels like it is helping it to drain, but the headaches and ringing are still present  She denies fever or chills  She has been functioning and going to work  She denies ear pain  She had 2 episodes of dizziness recently but not true vertigo          The following portions of the patient's history were reviewed and updated as appropriate: allergies, current medications, past family history, past medical history, past social history, past surgical history and problem list     Review of Systems   Constitutional: Positive for fatigue  Negative for chills and fever  HENT: Positive for congestion and tinnitus  Negative for ear pain, hearing loss and sore throat  Respiratory: Negative for cough  Gastrointestinal: Negative for abdominal pain, constipation, diarrhea and nausea  Neurological: Positive for dizziness and headaches  Objective:      /80   Pulse 75   Temp 97 6 °F (36 4 °C) (Tympanic)   Resp 14   Ht 5' 1" (1 549 m)   Wt 72 6 kg (160 lb)   SpO2 95%   BMI 30 23 kg/m²          Physical Exam   Constitutional: She is oriented to person, place, and time  She appears well-developed and well-nourished  HENT:   Head: Normocephalic and atraumatic     Mouth/Throat: Oropharynx is clear and moist  Nose with mucosal edema  TMs slightly dull     Neck: Normal range of motion  Neck supple  No thyromegaly present  Cardiovascular: Normal rate and regular rhythm  Pulmonary/Chest: Effort normal and breath sounds normal    Lymphadenopathy:     She has no cervical adenopathy  Neurological: She is alert and oriented to person, place, and time  Skin: Skin is warm and dry  Psychiatric: She has a normal mood and affect  Her behavior is normal    Nursing note and vitals reviewed

## 2019-01-15 DIAGNOSIS — Z12.11 ENCOUNTER FOR SCREENING COLONOSCOPY: Primary | ICD-10-CM

## 2019-02-28 DIAGNOSIS — G47.00 INSOMNIA, UNSPECIFIED TYPE: ICD-10-CM

## 2019-02-28 RX ORDER — ESZOPICLONE 3 MG/1
3 TABLET, FILM COATED ORAL
Qty: 90 TABLET | Refills: 0 | Status: SHIPPED | OUTPATIENT
Start: 2019-02-28 | End: 2019-07-17 | Stop reason: SDUPTHER

## 2019-07-17 DIAGNOSIS — G47.00 INSOMNIA, UNSPECIFIED TYPE: ICD-10-CM

## 2019-07-17 RX ORDER — ESZOPICLONE 3 MG/1
3 TABLET, FILM COATED ORAL
Qty: 90 TABLET | Refills: 0 | Status: SHIPPED | OUTPATIENT
Start: 2019-07-17 | End: 2019-11-20 | Stop reason: SDUPTHER

## 2019-10-17 ENCOUNTER — ANNUAL EXAM (OUTPATIENT)
Dept: OBGYN CLINIC | Facility: CLINIC | Age: 49
End: 2019-10-17
Payer: COMMERCIAL

## 2019-10-17 VITALS
BODY MASS INDEX: 32.85 KG/M2 | WEIGHT: 174 LBS | HEIGHT: 61 IN | SYSTOLIC BLOOD PRESSURE: 134 MMHG | DIASTOLIC BLOOD PRESSURE: 90 MMHG

## 2019-10-17 DIAGNOSIS — Z12.31 ENCOUNTER FOR SCREENING MAMMOGRAM FOR MALIGNANT NEOPLASM OF BREAST: ICD-10-CM

## 2019-10-17 DIAGNOSIS — Z12.4 SCREENING FOR CERVICAL CANCER: Primary | ICD-10-CM

## 2019-10-17 DIAGNOSIS — Z01.419 WOMEN'S ANNUAL ROUTINE GYNECOLOGICAL EXAMINATION: ICD-10-CM

## 2019-10-17 DIAGNOSIS — D21.9 FIBROID: ICD-10-CM

## 2019-10-17 DIAGNOSIS — Z11.51 SCREENING FOR HPV (HUMAN PAPILLOMAVIRUS): ICD-10-CM

## 2019-10-17 PROBLEM — N84.0 ENDOMETRIAL POLYP: Status: ACTIVE | Noted: 2019-10-17

## 2019-10-17 PROBLEM — N92.6 IRREGULAR BLEEDING: Status: ACTIVE | Noted: 2019-10-17

## 2019-10-17 PROBLEM — N84.0 ENDOMETRIAL POLYP: Status: RESOLVED | Noted: 2019-10-17 | Resolved: 2019-10-17

## 2019-10-17 PROCEDURE — 99396 PREV VISIT EST AGE 40-64: CPT | Performed by: OBSTETRICS & GYNECOLOGY

## 2019-10-17 PROCEDURE — G0145 SCR C/V CYTO,THINLAYER,RESCR: HCPCS | Performed by: OBSTETRICS & GYNECOLOGY

## 2019-10-17 PROCEDURE — 87624 HPV HI-RISK TYP POOLED RSLT: CPT | Performed by: OBSTETRICS & GYNECOLOGY

## 2019-10-17 NOTE — PATIENT INSTRUCTIONS
Uterine Fibroids   WHAT YOU NEED TO KNOW:   What are uterine fibroids? Uterine fibroids are growths found inside your uterus (womb)  Uterine fibroids also may be called tumors (lumps) or leiomyomas  Uterine fibroids often appear in groups, or you may have only one  They can be small or large, and they can grow in size  They are almost always benign (not cancer) and likely will not spread to other parts of your body  What increases my risk of getting uterine fibroids? The cause of uterine fibroids is not clear  Ask your healthcare provider about these and other risk factors for uterine fibroids:  · Heredity:  Your risk for uterine fibroids may increase if a close family member also has fibroids  · Hormone imbalance:  Hormones are chemicals that affect your growth  Too many hormones may cause uterine fibroids or make them grow  · Early onset of menstrual periods: If you started your period at an early age, you may be at risk for uterine fibroids  · Childbearing age:  Uterine fibroids occur more often in women of childbearing age  They are even more common when you are 36to 61years old  They may grow when you are pregnant and shrink after you no longer have a monthly period  · Excess weight:  Too much body weight may increase your hormone levels and lead to uterine fibroids  Ask your healthcare provider about your ideal weight for your height  What are the signs and symptoms of uterine fibroids? You may have no signs or symptoms  Symptoms depend on the size, type, and number of fibroids you have  Symptoms also depend on where the fibroids are inside your uterus  Signs and symptoms of fibroids include the following:  · Heavy or painful menstrual bleeding  · Pelvic pressure and pain  You may have increased pelvic pain during sex  · Constipation or pain when you have a bowel movement (BM)  · Frequent need to urinate  How are uterine fibroids diagnosed?   Ask your healthcare provider about these and other tests that you may need:  · Blood tests: You may need blood taken to give caregivers information about how your body is working  The blood may be taken from your hand, arm, or IV  · Pelvic exam:  This is also called an internal or vaginal exam  During a pelvic exam, your healthcare provider gently puts a speculum into your vagina  A speculum is a tool that opens your vagina  This lets your healthcare provider see your cervix (bottom part of your uterus)  With gloved hands, your healthcare provider will check the size and shape of your uterus and ovaries  · Vaginal ultrasound:  During this test, your healthcare provider places a small wand in your vagina  Sound waves from the wand show pictures of the inside of your uterus (womb) and ovaries  · Biopsy:  A biopsy is a tissue sample of a fibroid that your healthcare provider takes from your uterus for testing  How are uterine fibroids treated? You may not need treatment for your fibroids if you do not have symptoms  The following treatments may shrink your fibroids and help your pain:  · Medicines:     ¨ Hormone medicine: This medicine changes the level of certain hormones and may then help shrink your fibroids  ¨ Contraceptives: These medicines help prevent pregnancy  They also may help shrink your fibroids  ¨ Nonsteroidal anti-inflammatory medicine: This group of medicine is also called NSAIDs  Nonsteroidal anti-inflammatory medicine may help decrease pain, fever, and swelling  This medicine can be bought without a doctor's order  This medicine can cause stomach bleeding or kidney problems in certain people  · Surgery: The type of surgery you may have depends on the size, number, and location of your fibroids  Ask your healthcare provider for more information about the following:     ¨ Embolization: This surgery blocks or slows the flow of blood to the fibroid  This may make your fibroids shrink or disappear  ¨ Myomectomy: This surgery removes your uterine fibroids  ¨ Hysterectomy:  For this surgery, your healthcare provider removes your uterus from your body  You may need a hysterectomy if your condition is severe (very bad)  After this surgery, you will no longer be able to have children  When should I contact my healthcare provider? · Your symptoms, such as heavy bleeding, pain, or pelvic pressure, worsen  · You feel weak and are more tired than usual      · You do not feel like your bladder is empty after you urinate  You also may urinate small amounts more often  · You have more trouble having or are not able to have a BM  · You have new or worse hot flashes  · You have any questions about your condition or care  When should I seek immediate care or call 911? · Your heart begins to race, and you feel faint  · You begin to pass large blood clots from your vagina  CARE AGREEMENT:   You have the right to help plan your care  Learn about your health condition and how it may be treated  Discuss treatment options with your caregivers to decide what care you want to receive  You always have the right to refuse treatment  The above information is an  only  It is not intended as medical advice for individual conditions or treatments  Talk to your doctor, nurse or pharmacist before following any medical regimen to see if it is safe and effective for you  © 2017 [employer]  Information is for End User's use only and may not be sold, redistributed or otherwise used for commercial purposes  All illustrations and images included in CareNotes® are the copyrighted property of [employer]  or [employer]  Menopause   WHAT YOU NEED TO KNOW:   What is menopause? Menopause is a normal stage in a woman's life when her monthly periods stop  Menopause starts when the ovaries slowly stop making the female hormones estrogen and progesterone   After menopause, a woman is no longer able to become pregnant  A woman who has not had a period for a full year after the age of 39 is considered to be in menopause  Perimenopause is a stage before menopause that may cause signs and symptoms similar to menopause  Perimenopause can last an average of 4 to 5 years  What are the signs and symptoms of menopause? The signs and symptoms of menopause can be different from woman to woman:  · Menstrual period changes such as skipped periods or periods that are closer together, or lighter or heavier than usual    · Hot flashes (feeling warm, flushed, and sweaty)     · Mood changes such as irritability or decreased desire to have sex    · Breast changes such as tenderness or pain    · Hair changes such as thinning hair or increased hair on your face    · Vaginal changes such as increased dryness     · Urinary changes such as increased urinary tract infections (UTIs) or urgency (feeling that you need to urinate right away)    · Other symptoms such as headaches, trouble sleeping, fatigue, or heart palpitations (strong, fast heartbeats)  What do I need to know about menopause? · You can still get pregnant while you have periods  Continue to use birth control if you do not want to get pregnant  You may need to use birth control until it has been 1 year since your periods stopped  Ask your healthcare provider when you can stop using birth control to prevent pregnancy  · Hormone replacement therapy can be used to treat symptoms of menopause  Hormone replacement therapy (HRT) is medicine that replaces your low hormone levels  HRT contains estrogen and sometimes progestin  HRT has benefits and risks  HRT decreases your risk for bone fractures by helping to prevent osteoporosis  HRT also protects you from colon cancer  HRT may increase your risk for breast cancer, blood clots, heart disease, and stroke  Ask your healthcare provider if HRT is right for you    How can I live a healthy lifestyle during and after menopause? After menopause, your risk for heart disease and bone loss increases  Ask about these and other ways to stay healthy:  · Exercise regularly  Exercise helps you maintain a healthy weight  Exercise can also help to control your blood pressure and cholesterol levels  Include weight-bearing exercise for strong bones  Ask your healthcare provider about the best exercise plan for you  · Eat a variety of healthy foods  Include fruits, vegetables, whole grains (whole-wheat bread, pasta, and cereals), low-fat dairy, and lean protein foods (beans, poultry, and fish)  Limit foods high in sodium (salt)  Ask your healthcare provider for more information about a meal plan that is right for you  · Maintain a healthy weight  Check with your healthcare provider before you start any weight loss program      · Take supplements as directed  You may need extra calcium and vitamin D to help prevent osteoporosis  · Limit alcohol and caffeine  Alcohol and caffeine may worsen your symptoms  · Do not smoke  If you smoke, it is never too late to quit  You are more likely to have a heart attack, lung disease, blood clots, and cancer if you smoke  Ask your healthcare provider for information if you need help quitting  When should I contact my healthcare provider? · You have vaginal bleeding after menopause  · You have questions or concerns about your condition or care  CARE AGREEMENT:   You have the right to help plan your care  Learn about your health condition and how it may be treated  Discuss treatment options with your caregivers to decide what care you want to receive  You always have the right to refuse treatment  The above information is an  only  It is not intended as medical advice for individual conditions or treatments  Talk to your doctor, nurse or pharmacist before following any medical regimen to see if it is safe and effective for you    © 2017 [employer] [address] [place] [address] is for End User's use only and may not be sold, redistributed or otherwise used for commercial purposes  All illustrations and images included in CareNotes® are the copyrighted property of [employer]  or [employer]

## 2019-10-17 NOTE — PROGRESS NOTES
Assessment/Plan   Diagnoses and all orders for this visit:    Screening for cervical cancer  -     Liquid-based pap, screening    Encounter for screening mammogram for malignant neoplasm of breast  -     Mammo screening bilateral w 3d & cad; Future    Screening for HPV (human papillomavirus)  -     Liquid-based pap, screening     1  Yearly exam-Pap smear done with HPV testing, self-breast awareness reviewed, calcium/vitamin-D recommendations discussed, mammogram request given, colonoscopy recommended as per specialist  2  Contraception- status post vasectomy  3  Uterine fibroid-5 2 myoma noted most recently with imaging from September 2017  Uterus is midposition 8 week size which is stable from most recent exam   She will call or return with any issues  4  Perimenopausal-menses most recently 9/21/19 with previous menses June  She has noted menses every few months time  She was counseled this is quite common in the perimenopausal time frame  She will call or return with any menometrorrhagia  5  Prior history of elevated Tyrer Lewis risk-most recent mammogram July 2018 with normal density and normal risk  Patient will continue 3D mammograms and request was given  6  Prior history irregular bleeding/endometrial polyp-resolved after hysteroscopy with D&C 2016   7  Other-daughter is pregnant through our practice, due to have her 1st grandchild in about 6 weeks time  My congratulations were given  She will follow-up in 1 year or as needed  Subjective   Patient ID: Laisha Villa is a 44 y o  female  Vitals:    10/17/19 1536   BP: 134/90     Patient was seen today for yearly exam   Please see assessment plan for details  The following portions of the patient's history were reviewed and updated as appropriate: allergies, current medications, past family history, past medical history, past social history and past surgical history    Past Medical History:   Diagnosis Date    Abnormal uterine bleeding (AUB)     spotting between periods    Anxiety     Depression     history of depression    Dysmenorrhea     Insomnia     Recurrent pregnancy loss, antepartum condition or complication     Tubal pregnancy     Uterine polyp     Wears glasses      Past Surgical History:   Procedure Laterality Date    BREAST SURGERY      biopsy; implants     SECTION      EYE SURGERY Right     lazy eye correction     HEMORROIDECTOMY      HI ELBOW ARTHROSCOP,EXTEN DEBRIDE Right 10/17/2016    Procedure: ARTHROSCOPY ELBOW WITH LATERAL EPICONDYLE DEBRIDMENT; ECRB RELEASE;  Surgeon: Darrius Beard MD;  [facility];  Service: Orthopedics    HI HYSTEROSCOPY,W/ENDO BX N/A 2016    Procedure: DILATATION AND CURETTAGE (D&C) WITH HYSTEROSCOPY POLYPECTOMY;  Surgeon: Natasha Aguirre MD;  [facility];  Service: Gynecology    WISDOM TOOTH EXTRACTION       OB History    Para Term  AB Living   4 2 2   2 2   SAB TAB Ectopic Multiple Live Births           2      # Outcome Date GA Lbr Chris/2nd Weight Sex Delivery Anes PTL Lv   4 AB            3 AB            2 Term            1 Term                Current Outpatient Medications:     eszopiclone (LUNESTA) 3 MG tablet, Take 1 tablet (3 mg total) by mouth daily at bedtime Take immediately before bedtime, Disp: 90 tablet, Rfl: 0    LORazepam (ATIVAN) 1 mg tablet, Take 0 5 tablets (0 5 mg total) by mouth daily as needed for anxiety, Disp: 90 tablet, Rfl: 0    Multiple Vitamin (MULTI VITAMIN DAILY PO), Take 3 tablets by mouth daily gummies , Disp: , Rfl:   Allergies   Allergen Reactions    Augmentin [Amoxicillin-Pot Clavulanate] GI Intolerance     md ok's use of cefazolin for 0R on 10/17/16 ( Dr Dayne Corbett)   [facility] Phenobarbital Other (See Comments)     hallucinations     Social History     Socioeconomic History    Marital status: /Civil Union     Spouse name: None    Number of children: None    Years of education: None    Highest education level: None   Occupational History    None   Social Needs    Financial resource strain: None    Food insecurity:     Worry: None     Inability: None    Transportation needs:     Medical: None     Non-medical: None   Tobacco Use    Smoking status: Current Every Day Smoker     Packs/day: 1 00     Years: 4 00     Pack years: 4 00    Smokeless tobacco: Never Used    Tobacco comment: quit for 20yrs but started smoking again 4yr ago   Substance and Sexual Activity    Alcohol use: Yes     Comment: socially    Drug use: No    Sexual activity: Yes     Birth control/protection: None, Male Sterilization   Lifestyle    Physical activity:     Days per week: None     Minutes per session: None    Stress: None   Relationships    Social connections:     Talks on phone: None     Gets together: None     Attends [employer] service: None     Active member of club or organization: None     Attends meetings of clubs or organizations: None     Relationship status: None    Intimate partner violence:     Fear of current or ex partner: None     Emotionally abused: None     Physically abused: None     Forced sexual activity: None   Other Topics Concern    None   Social History Narrative    None     Family History   Problem Relation Age of Onset    Hypertension Mother     Rheum arthritis Father     Hypertension Sister     Lung cancer Maternal Grandmother     Breast cancer Paternal Grandmother     Cancer Maternal Aunt        Review of Systems   Constitutional: Negative for chills, diaphoresis, fatigue and fever  Respiratory: Negative for apnea, cough, chest tightness, shortness of breath and wheezing  Cardiovascular: Negative for chest pain, palpitations and leg swelling  Gastrointestinal: Negative for abdominal distention, abdominal pain, anal bleeding, constipation, diarrhea, nausea, rectal pain and vomiting     Genitourinary: Negative for difficulty urinating, dyspareunia, dysuria, frequency, hematuria, menstrual problem, pelvic pain, urgency, vaginal bleeding, vaginal discharge and vaginal pain  Musculoskeletal: Negative for arthralgias, back pain and myalgias  Skin: Negative for color change and rash  Neurological: Negative for dizziness, syncope, light-headedness, numbness and headaches  Hematological: Negative for adenopathy  Does not bruise/bleed easily  Psychiatric/Behavioral: Negative for dysphoric mood and sleep disturbance  The patient is not nervous/anxious  Objective   Physical Exam    Objective      /90 (BP Location: Left arm, Patient Position: Sitting, Cuff Size: Standard)   Ht 5' 1" (1 549 m)   Wt 78 9 kg (174 lb)   LMP 09/21/2019   BMI 32 88 kg/m²     General:   alert and oriented, in no acute distress   Neck: normal to inspection and palpation   Breast: normal appearance, no masses or tenderness   Heart:    Lungs:    Abdomen: soft, non-tender, without masses or organomegaly   Vulva: normal   Vagina: Without erythema or lesions or discharge  Normal   Cervix: Without lesions or discharge or cervicitis    No Cervical motion tenderness   Uterus: mid-position, non-tender, 8 week size, nontender   Adnexa: no mass, fullness, tenderness   Rectum: negative    Psych:  Normal mood and affect   Skin:  Without obvious lesions   Eyes: symmetric, with normal movements and reactivity   Musculoskeletal:  Normal muscle tone and movements appreciated

## 2019-10-20 LAB
HPV HR 12 DNA CVX QL NAA+PROBE: NEGATIVE
HPV16 DNA CVX QL NAA+PROBE: NEGATIVE
HPV18 DNA CVX QL NAA+PROBE: NEGATIVE

## 2019-10-21 LAB
LAB AP GYN PRIMARY INTERPRETATION: NORMAL
Lab: NORMAL

## 2019-11-20 DIAGNOSIS — G47.00 INSOMNIA, UNSPECIFIED TYPE: ICD-10-CM

## 2019-11-20 RX ORDER — ESZOPICLONE 3 MG/1
3 TABLET, FILM COATED ORAL
Qty: 90 TABLET | Refills: 0 | Status: SHIPPED | OUTPATIENT
Start: 2019-11-20 | End: 2020-02-27 | Stop reason: SDUPTHER

## 2019-12-03 ENCOUNTER — OFFICE VISIT (OUTPATIENT)
Dept: FAMILY MEDICINE CLINIC | Facility: CLINIC | Age: 49
End: 2019-12-03
Payer: COMMERCIAL

## 2019-12-03 VITALS
OXYGEN SATURATION: 97 % | HEART RATE: 80 BPM | BODY MASS INDEX: 31.81 KG/M2 | TEMPERATURE: 98.4 F | HEIGHT: 61 IN | WEIGHT: 168.5 LBS | RESPIRATION RATE: 18 BRPM | DIASTOLIC BLOOD PRESSURE: 74 MMHG | SYSTOLIC BLOOD PRESSURE: 128 MMHG

## 2019-12-03 DIAGNOSIS — Z13.220 SCREENING FOR HYPERLIPIDEMIA: ICD-10-CM

## 2019-12-03 DIAGNOSIS — Z23 NEED FOR INFLUENZA VACCINATION: ICD-10-CM

## 2019-12-03 DIAGNOSIS — Z11.4 SCREENING FOR HIV WITHOUT PRESENCE OF RISK FACTORS: ICD-10-CM

## 2019-12-03 DIAGNOSIS — M79.671 RIGHT FOOT PAIN: Primary | ICD-10-CM

## 2019-12-03 DIAGNOSIS — F17.200 CURRENT EVERY DAY SMOKER: ICD-10-CM

## 2019-12-03 PROBLEM — Z72.0 TOBACCO USE: Status: ACTIVE | Noted: 2019-12-03

## 2019-12-03 PROCEDURE — 99213 OFFICE O/P EST LOW 20 MIN: CPT | Performed by: FAMILY MEDICINE

## 2019-12-03 PROCEDURE — 90471 IMMUNIZATION ADMIN: CPT

## 2019-12-03 PROCEDURE — 90682 RIV4 VACC RECOMBINANT DNA IM: CPT

## 2019-12-03 PROCEDURE — 3008F BODY MASS INDEX DOCD: CPT | Performed by: FAMILY MEDICINE

## 2019-12-03 NOTE — PROGRESS NOTES
Assessment/Plan:    Right foot pain  I recommended continuation of ice packs  Also suggested use of Aspercreme with lidocaine 4%  Recommended trial of donut cushion over the area  Gave Podiatry referral as well  Diagnoses and all orders for this visit:    Right foot pain  -     Ambulatory referral to Podiatry; Future    Need for influenza vaccination  -     influenza vaccine, [id number], quadrivalent, recombinant, PF, 0 5 mL, for patients 18 yr+ (FLUBLOK)    Screening for hyperlipidemia  -     Lipid panel; Future  -     Comprehensive metabolic panel; Future    Current every day smoker  -     CBC and differential; Future    Screening for HIV without presence of risk factors  -     HIV 1/2 AG-AB combo; Future          Subjective:      Patient ID: Berta Pérez is a 44 y o  female  Right foot pain for 2 mos  At first sx were mild but they have worsened  Pain is right in the center of the foot located over the ball of foot  She describes pain as sharp with walking  She has decrease in pain with nonweightbearing, but still feels it  Ice packs did not really provide much relief  No swelling  She has also tried stretching, gel insoles, no help  She denies injury  She denies change in job or changes exercise  She has a desk job in customer service  She had plantar fasciitis a couple yrs ago which resolved    BMI Counseling: Body mass index is 31 84 kg/m²  The BMI is above normal  Nutrition recommendations include decreasing portion sizes and encouraging healthy choices of fruits and vegetables  Exercise recommendations include exercising 3-5 times per week  No pharmacotherapy was ordered  Patient referred to PCP due to patient being overweight           The following portions of the patient's history were reviewed and updated as appropriate: allergies, current medications, past family history, past medical history, past social history, past surgical history and problem list     Review of Systems Constitutional: Negative for chills, fatigue and fever  Respiratory: Positive for shortness of breath  Cardiovascular: Negative for chest pain and palpitations  Musculoskeletal: Positive for arthralgias and back pain  Objective:      /74   Pulse 80   Temp 98 4 °F (36 9 °C)   Resp 18   Ht 5' 1" (1 549 m)   Wt 76 4 kg (168 lb 8 oz)   SpO2 97%   BMI 31 84 kg/m²          Physical Exam   Constitutional: She appears well-developed and well-nourished  Cardiovascular: Normal rate and regular rhythm  Pulmonary/Chest: Effort normal and breath sounds normal    Musculoskeletal: Normal range of motion  Feet with normal pulses and sensation  There is tenderness plantar aspect of right foot beneath the 2nd and 3rd metatarsal heads  No mass or swelling detected  Nursing note and vitals reviewed  Tobacco Cessation Counseling: Tobacco cessation counseling and education was provided  The patient is sincerely urged to quit consumption of tobacco  She is not ready to quit tobacco  The numerous health risks of tobacco consumption were discussed  If she decides to quit, there are a number of helpful adjunctive aids, and she can see me to discuss nicotine replacement therapy, chantix, or bupropion anytime in the future

## 2019-12-03 NOTE — LETTER
December 3, 2019     Patient: Sudhakar Arguello   YOB: 1970   Date of Visit: 12/3/2019       To Whom it May Concern:    Sudhakar Arguello is under my professional care  She was seen in my office on 12/3/2019  She may return to work on 12/4/2019  Please excuse her absence on 12/2/2019 and 12/3/2019  If you have any questions or concerns, please don't hesitate to call           Sincerely,          Lyssa Bearden MD

## 2019-12-03 NOTE — ASSESSMENT & PLAN NOTE
I recommended continuation of ice packs  Also suggested use of Aspercreme with lidocaine 4%  Recommended trial of donut cushion over the area  Gave Podiatry referral as well

## 2020-01-06 ENCOUNTER — HOSPITAL ENCOUNTER (OUTPATIENT)
Dept: MAMMOGRAPHY | Facility: MEDICAL CENTER | Age: 50
Discharge: HOME | End: 2020-01-06
Payer: COMMERCIAL

## 2020-01-06 VITALS — WEIGHT: 168 LBS | HEIGHT: 61 IN | BODY MASS INDEX: 31.72 KG/M2

## 2020-01-06 DIAGNOSIS — Z12.31 ENCOUNTER FOR SCREENING MAMMOGRAM FOR MALIGNANT NEOPLASM OF BREAST: ICD-10-CM

## 2020-01-06 PROCEDURE — 77067 SCR MAMMO BI INCL CAD: CPT

## 2020-01-06 PROCEDURE — 77063 BREAST TOMOSYNTHESIS BI: CPT

## 2020-02-27 DIAGNOSIS — G47.00 INSOMNIA, UNSPECIFIED TYPE: ICD-10-CM

## 2020-02-27 RX ORDER — ESZOPICLONE 3 MG/1
3 TABLET, FILM COATED ORAL
Qty: 90 TABLET | Refills: 0 | Status: SHIPPED | OUTPATIENT
Start: 2020-02-27 | End: 2020-06-24 | Stop reason: SDUPTHER

## 2020-06-24 DIAGNOSIS — G47.00 INSOMNIA, UNSPECIFIED TYPE: ICD-10-CM

## 2020-06-24 RX ORDER — ESZOPICLONE 3 MG/1
3 TABLET, FILM COATED ORAL
Qty: 90 TABLET | Refills: 0 | Status: SHIPPED | OUTPATIENT
Start: 2020-06-24 | End: 2020-09-22 | Stop reason: SDUPTHER

## 2020-08-09 ENCOUNTER — PATIENT MESSAGE (OUTPATIENT)
Dept: FAMILY MEDICINE CLINIC | Facility: CLINIC | Age: 50
End: 2020-08-09

## 2020-08-09 DIAGNOSIS — F17.200 CURRENT EVERY DAY SMOKER: Primary | ICD-10-CM

## 2020-08-11 RX ORDER — NICOTINE 21 MG/24HR
1 PATCH, TRANSDERMAL 24 HOURS TRANSDERMAL EVERY 24 HOURS
Qty: 28 PATCH | Refills: 0 | Status: SHIPPED | OUTPATIENT
Start: 2020-08-11 | End: 2021-10-12

## 2020-08-11 NOTE — TELEPHONE ENCOUNTER
From: Wilma Dumont  To: Lynne Mojica MD  Sent: 8/9/2020 2:48 PM EDT  Subject: Prescription Question    Would like to request script be called in to [facility] for the nicotine patch 21 mg  Thank you! Previous request was in 7/27  Can be called in to my pharmacy at [address] [place] [address]

## 2020-09-08 ENCOUNTER — OFFICE VISIT (OUTPATIENT)
Dept: FAMILY MEDICINE CLINIC | Facility: CLINIC | Age: 50
End: 2020-09-08
Payer: COMMERCIAL

## 2020-09-08 VITALS
HEART RATE: 80 BPM | SYSTOLIC BLOOD PRESSURE: 100 MMHG | TEMPERATURE: 98.5 F | HEIGHT: 61 IN | RESPIRATION RATE: 18 BRPM | OXYGEN SATURATION: 97 % | BODY MASS INDEX: 29.64 KG/M2 | DIASTOLIC BLOOD PRESSURE: 80 MMHG | WEIGHT: 157 LBS

## 2020-09-08 DIAGNOSIS — F17.200 CURRENT EVERY DAY SMOKER: ICD-10-CM

## 2020-09-08 DIAGNOSIS — E78.5 HYPERLIPIDEMIA, UNSPECIFIED HYPERLIPIDEMIA TYPE: ICD-10-CM

## 2020-09-08 DIAGNOSIS — F41.9 ANXIETY: ICD-10-CM

## 2020-09-08 DIAGNOSIS — Z00.00 ANNUAL PHYSICAL EXAM: Primary | ICD-10-CM

## 2020-09-08 DIAGNOSIS — G47.00 INSOMNIA, UNSPECIFIED TYPE: ICD-10-CM

## 2020-09-08 DIAGNOSIS — Z23 NEED FOR 23-POLYVALENT PNEUMOCOCCAL POLYSACCHARIDE VACCINE: ICD-10-CM

## 2020-09-08 DIAGNOSIS — E66.3 OVERWEIGHT WITH BODY MASS INDEX (BMI) OF 29 TO 29.9 IN ADULT: ICD-10-CM

## 2020-09-08 PROCEDURE — 99396 PREV VISIT EST AGE 40-64: CPT | Performed by: FAMILY MEDICINE

## 2020-09-08 PROCEDURE — 90732 PPSV23 VACC 2 YRS+ SUBQ/IM: CPT

## 2020-09-08 PROCEDURE — 4004F PT TOBACCO SCREEN RCVD TLK: CPT | Performed by: FAMILY MEDICINE

## 2020-09-08 PROCEDURE — 90471 IMMUNIZATION ADMIN: CPT

## 2020-09-08 PROCEDURE — 3725F SCREEN DEPRESSION PERFORMED: CPT | Performed by: FAMILY MEDICINE

## 2020-09-08 NOTE — ASSESSMENT & PLAN NOTE
Elevated lipids  Will give another lab slip for fasting lipid profile early March, 2021  Urged her to maintain a healthy diet and exercise

## 2020-09-08 NOTE — PROGRESS NOTES
[facility]ULT [facility]    NAME: Brea Roman  AGE: 44 y o  SEX: female  : 1970     DATE: 2020     Assessment and Plan:     Problem List Items Addressed This Visit        Other    Anxiety       Her anxiety has been very well controlled with only rare use of Ativan  Hyperlipidemia       Elevated lipids  Will give another lab slip for fasting lipid profile early   Urged her to maintain a healthy diet and exercise  Relevant Orders    Lipid panel    Comprehensive metabolic panel    Insomnia       Stable on escitalopram 3 mg p o  q h s            Current every day smoker     Urged her to quit  She would like to quit for her birthday   Overweight with body mass index (BMI) of 29 to 29 9 in adult     BMI counseling done           Other Visit Diagnoses     Annual physical exam    -  Primary    Need for 23-polyvalent pneumococcal polysaccharide vaccine        Relevant Orders    PNEUMOCOCCAL POLYSACCHARIDE VACCINE 23-VALENT =>1YO SQ IM (Completed)          Immunizations and preventive care screenings were discussed with patient today  Appropriate education was printed on patient's after visit summary  Counseling:  · Exercise: the importance of regular exercise/physical activity was discussed  Recommend exercise 3-5 times per week for at least 30 minutes  BMI Counseling: Body mass index is 29 66 kg/m²  The BMI is above normal  Nutrition recommendations include decreasing portion sizes and encouraging healthy choices of fruits and vegetables  Exercise recommendations include moderate physical activity 150 minutes/week  Tobacco Cessation Counseling: Tobacco cessation counseling was provided  The patient is sincerely urged to quit consumption of tobacco  She is ready to quit tobacco        No follow-ups on file       Chief Complaint:     Chief Complaint   Patient presents with   Ardeth Needs Annual Exam      History of Present Illness:     Adult Annual Physical   Patient here for a comprehensive physical exam  The patient reports problems - allergic rhinitis  Diet and Physical Activity  · Diet/Nutrition: well balanced diet, limited junk food and consuming 3-5 servings of fruits/vegetables daily  · Exercise: walking and 3-4 times a week on average  Depression Screening  PHQ-9 Depression Screening    PHQ-9:    Frequency of the following problems over the past two weeks:       Little interest or pleasure in doing things:  0 - not at all  Feeling down, depressed, or hopeless:  0 - not at all  PHQ-2 Score:  0       General Health  · Sleep: sleeps well and gets 7-8 hours of sleep on average  · Hearing: normal - bilateral   · Vision: goes for regular eye exams and previous LASIK surgery  · Dental: regular dental visits, brushes teeth twice daily and does not floss  /GYN Health  · Patient is: postmenopausal  · Last menstrual period: 5 mos ago  · Contraceptive method: none  Review of Systems:     Review of Systems   Constitutional: Negative for activity change, chills, fatigue and fever  HENT: Positive for congestion  Negative for ear pain, sinus pressure and sore throat  Eyes: Negative for pain and visual disturbance  Respiratory: Negative for cough, chest tightness, shortness of breath and wheezing  Cardiovascular: Negative for chest pain, palpitations and leg swelling  Gastrointestinal: Negative for abdominal pain, blood in stool, constipation, diarrhea, nausea and vomiting  Endocrine: Negative for polydipsia and polyuria  Genitourinary: Negative for difficulty urinating, dysuria, frequency and urgency  Musculoskeletal: Positive for arthralgias  Negative for joint swelling and myalgias  Skin: Negative for rash  Neurological: Negative for dizziness, weakness, numbness and headaches  Hematological: Negative for adenopathy  Does not bruise/bleed easily  Psychiatric/Behavioral: Positive for sleep disturbance  Negative for dysphoric mood  The patient is not nervous/anxious         Past Medical History:     Past Medical History:   Diagnosis Date    Abnormal uterine bleeding (AUB)     spotting between periods    Anxiety     Depression     history of depression    Dysmenorrhea     Fibroid 10/17/2019    Insomnia     Recurrent pregnancy loss, antepartum condition or complication     Tubal pregnancy     Uterine polyp     Wears glasses       Past Surgical History:     Past Surgical History:   Procedure Laterality Date    AUGMENTATION MAMMAPLASTY Bilateral 2009    BREAST SURGERY      biopsy; implants     SECTION      EYE SURGERY Right     lazy eye correction     HEMORROIDECTOMY      SD ELBOW ARTHROSCOP,EXTEN DEBRIDE Right 10/17/2016    Procedure: ARTHROSCOPY ELBOW WITH LATERAL EPICONDYLE DEBRIDMENT; ECRB RELEASE;  Surgeon: Raj Blankenship MD;  [facility];  Service: Orthopedics    SD HYSTEROSCOPY,W/ENDO BX N/A 2016    Procedure: DILATATION AND CURETTAGE (D&C) WITH HYSTEROSCOPY POLYPECTOMY;  Surgeon: Osito Agee MD;  [facility];  Service: Gynecology    WISDOM TOOTH EXTRACTION        Social History:        Social History     Socioeconomic History    Marital status: /Civil Union     Spouse name: None    Number of children: None    Years of education: None    Highest education level: None   Occupational History    None   Social Needs    Financial resource strain: None    Food insecurity     Worry: None     Inability: None    Transportation needs     Medical: None     Non-medical: None   Tobacco Use    Smoking status: Current Every Day Smoker     Packs/day: 1 00     Years: 4 00     Pack years: 4 00    Smokeless tobacco: Never Used    Tobacco comment: quit for 20yrs but started smoking again 4yr ago   Substance and Sexual Activity    Alcohol use: Yes     Comment: socially    Drug use: No    Sexual activity: Yes     Birth control/protection: None, Male Sterilization   Lifestyle    Physical activity     Days per week: None     Minutes per session: None    Stress: None   Relationships    Social connections     Talks on phone: None     Gets together: None     Attends [employer]ism service: None     Active member of club or organization: None     Attends meetings of clubs or organizations: None     Relationship status: None    Intimate partner violence     Fear of current or ex partner: None     Emotionally abused: None     Physically abused: None     Forced sexual activity: None   Other Topics Concern    None   Social History Narrative    None      Family History:     Family History   Problem Relation Age of Onset    Hypertension Mother     Rheum arthritis Father     Hypertension Sister     Lung cancer Maternal Grandmother [de-identified]    Breast cancer Paternal Grandmother 27    Cancer Maternal Aunt 39    No Known Problems Daughter     No Known Problems Daughter     No Known Problems Maternal Grandfather     No Known Problems Paternal Grandfather     No Known Problems Maternal Aunt       Current Medications:     Current Outpatient Medications   Medication Sig Dispense Refill    eszopiclone (Lunesta) 3 MG tablet Take 1 tablet (3 mg total) by mouth daily at bedtime take immediately before bedtime 90 tablet 0    LORazepam (ATIVAN) 1 mg tablet Take 0 5 tablets (0 5 mg total) by mouth daily as needed for anxiety 90 tablet 0    Multiple Vitamin (MULTI VITAMIN DAILY PO) Take 3 tablets by mouth daily gummies       nicotine (NICODERM CQ) 21 mg/24 hr TD 24 hr patch Place 1 patch on the skin every 24 hours 28 patch 0     No current facility-administered medications for this visit  Allergies:      Allergies   Allergen Reactions    Amoxicillin Abdominal Pain    Augmentin [Amoxicillin-Pot Clavulanate] GI Intolerance     md harris's use of cefazolin for 0R on 10/17/16 ( Dr Issa Ferrell)    Clavulanic Acid Abdominal Pain    Phenobarbital Other (See Comments) and Delirium     hallucinations      Physical Exam:     /80   Pulse 80   Temp 98 5 °F (36 9 °C)   Resp 18   Ht 5' 1" (1 549 m)   Wt 71 2 kg (157 lb)   SpO2 97%   BMI 29 66 kg/m²     Physical Exam  Vitals signs and nursing note reviewed  Constitutional:       Appearance: Normal appearance  She is well-developed  HENT:      Head: Normocephalic and atraumatic  Right Ear: Tympanic membrane, ear canal and external ear normal       Left Ear: Tympanic membrane, ear canal and external ear normal       Mouth/Throat:      Mouth: Mucous membranes are moist    Eyes:      Extraocular Movements: Extraocular movements intact  Pupils: Pupils are equal, round, and reactive to light  Neck:      Musculoskeletal: Normal range of motion and neck supple  Thyroid: No thyromegaly  Cardiovascular:      Rate and Rhythm: Normal rate and regular rhythm  Pulses: Normal pulses  Heart sounds: Normal heart sounds  No murmur  Pulmonary:      Effort: Pulmonary effort is normal  No respiratory distress  Breath sounds: Normal breath sounds  Abdominal:      General: Bowel sounds are normal  There is no distension  Palpations: Abdomen is soft  There is no mass  Tenderness: There is no abdominal tenderness  Musculoskeletal: Normal range of motion  Lymphadenopathy:      Cervical: No cervical adenopathy  Skin:     General: Skin is warm and dry  Findings: No erythema or rash  Neurological:      General: No focal deficit present  Mental Status: She is alert and oriented to person, place, and time  Cranial Nerves: No cranial nerve deficit  Sensory: No sensory deficit        Coordination: Coordination normal       Deep Tendon Reflexes: Reflexes normal    Psychiatric:         Mood and Affect: Mood normal          Behavior: Behavior normal           MD Ellie Mckenna [id number]

## 2020-09-22 DIAGNOSIS — G47.00 INSOMNIA, UNSPECIFIED TYPE: ICD-10-CM

## 2020-09-22 RX ORDER — ESZOPICLONE 3 MG/1
3 TABLET, FILM COATED ORAL
Qty: 90 TABLET | Refills: 1 | Status: SHIPPED | OUTPATIENT
Start: 2020-09-22 | End: 2021-03-04

## 2020-10-22 ENCOUNTER — ANNUAL EXAM (OUTPATIENT)
Dept: OBGYN CLINIC | Facility: CLINIC | Age: 50
End: 2020-10-22
Payer: COMMERCIAL

## 2020-10-22 VITALS
TEMPERATURE: 98 F | HEIGHT: 61 IN | WEIGHT: 158 LBS | SYSTOLIC BLOOD PRESSURE: 112 MMHG | BODY MASS INDEX: 29.83 KG/M2 | DIASTOLIC BLOOD PRESSURE: 84 MMHG

## 2020-10-22 DIAGNOSIS — Z01.419 WOMEN'S ANNUAL ROUTINE GYNECOLOGICAL EXAMINATION: Primary | ICD-10-CM

## 2020-10-22 DIAGNOSIS — N95.2 VAGINAL ATROPHY: ICD-10-CM

## 2020-10-22 DIAGNOSIS — Z78.0 MENOPAUSE: ICD-10-CM

## 2020-10-22 DIAGNOSIS — Z12.31 ENCOUNTER FOR SCREENING MAMMOGRAM FOR BREAST CANCER: ICD-10-CM

## 2020-10-22 PROCEDURE — 99396 PREV VISIT EST AGE 40-64: CPT | Performed by: OBSTETRICS & GYNECOLOGY

## 2020-11-25 ENCOUNTER — OFFICE VISIT (OUTPATIENT)
Dept: FAMILY MEDICINE CLINIC | Facility: CLINIC | Age: 50
End: 2020-11-25
Payer: COMMERCIAL

## 2020-11-25 VITALS
BODY MASS INDEX: 30.17 KG/M2 | RESPIRATION RATE: 18 BRPM | OXYGEN SATURATION: 97 % | TEMPERATURE: 98.4 F | WEIGHT: 159.8 LBS | DIASTOLIC BLOOD PRESSURE: 80 MMHG | HEIGHT: 61 IN | HEART RATE: 80 BPM | SYSTOLIC BLOOD PRESSURE: 110 MMHG

## 2020-11-25 DIAGNOSIS — G89.29 CHRONIC BILATERAL LOW BACK PAIN WITHOUT SCIATICA: Primary | ICD-10-CM

## 2020-11-25 DIAGNOSIS — Z23 NEED FOR VACCINATION: ICD-10-CM

## 2020-11-25 DIAGNOSIS — L30.9 DERMATITIS: ICD-10-CM

## 2020-11-25 DIAGNOSIS — M54.50 CHRONIC BILATERAL LOW BACK PAIN WITHOUT SCIATICA: Primary | ICD-10-CM

## 2020-11-25 DIAGNOSIS — M79.672 BILATERAL FOOT PAIN: ICD-10-CM

## 2020-11-25 DIAGNOSIS — M79.671 BILATERAL FOOT PAIN: ICD-10-CM

## 2020-11-25 PROCEDURE — 4004F PT TOBACCO SCREEN RCVD TLK: CPT | Performed by: FAMILY MEDICINE

## 2020-11-25 PROCEDURE — 3008F BODY MASS INDEX DOCD: CPT | Performed by: FAMILY MEDICINE

## 2020-11-25 PROCEDURE — 99214 OFFICE O/P EST MOD 30 MIN: CPT | Performed by: FAMILY MEDICINE

## 2020-11-25 PROCEDURE — 90471 IMMUNIZATION ADMIN: CPT

## 2020-11-25 PROCEDURE — 90682 RIV4 VACC RECOMBINANT DNA IM: CPT

## 2021-03-04 DIAGNOSIS — G47.00 INSOMNIA, UNSPECIFIED TYPE: ICD-10-CM

## 2021-03-04 RX ORDER — ESZOPICLONE 3 MG/1
TABLET, FILM COATED ORAL
Qty: 90 TABLET | Refills: 1 | Status: SHIPPED | OUTPATIENT
Start: 2021-03-04 | End: 2021-09-10 | Stop reason: SDUPTHER

## 2021-03-10 ENCOUNTER — CONSULT (OUTPATIENT)
Dept: OBGYN CLINIC | Facility: MEDICAL CENTER | Age: 51
End: 2021-03-10
Attending: FAMILY MEDICINE
Payer: COMMERCIAL

## 2021-03-10 VITALS
HEIGHT: 61 IN | DIASTOLIC BLOOD PRESSURE: 87 MMHG | WEIGHT: 159 LBS | HEART RATE: 85 BPM | BODY MASS INDEX: 30.02 KG/M2 | SYSTOLIC BLOOD PRESSURE: 140 MMHG

## 2021-03-10 DIAGNOSIS — M54.50 CHRONIC BILATERAL LOW BACK PAIN WITHOUT SCIATICA: ICD-10-CM

## 2021-03-10 DIAGNOSIS — G89.29 CHRONIC BILATERAL LOW BACK PAIN WITHOUT SCIATICA: ICD-10-CM

## 2021-03-10 PROCEDURE — 3008F BODY MASS INDEX DOCD: CPT | Performed by: STUDENT IN AN ORGANIZED HEALTH CARE EDUCATION/TRAINING PROGRAM

## 2021-03-10 PROCEDURE — 99243 OFF/OP CNSLTJ NEW/EST LOW 30: CPT | Performed by: STUDENT IN AN ORGANIZED HEALTH CARE EDUCATION/TRAINING PROGRAM

## 2021-03-10 PROCEDURE — 4004F PT TOBACCO SCREEN RCVD TLK: CPT | Performed by: STUDENT IN AN ORGANIZED HEALTH CARE EDUCATION/TRAINING PROGRAM

## 2021-03-10 RX ORDER — NAPROXEN 500 MG/1
500 TABLET ORAL 2 TIMES DAILY WITH MEALS
Qty: 60 TABLET | Refills: 1 | Status: SHIPPED | OUTPATIENT
Start: 2021-03-10 | End: 2021-06-10

## 2021-03-10 NOTE — PROGRESS NOTES
1  Chronic bilateral low back pain without sciatica  Ambulatory referral to Orthopedic Surgery    naproxen (NAPROSYN) 500 mg tablet    Nerve Stimulator (TENS Therapy Pain Relief) RAMIN    DISCONTINUED: Nerve Stimulator (TENS Therapy Pain Relief) RAMIN     No orders of the defined types were placed in this encounter  Imaging Studies (I personally reviewed images in PACS and report):    None ordered today    IMPRESSION:  Chronic lumbar pain without radiculopathy - no red flag signs on exam    Contributing factors: BMI 30    PLAN:  Clinical exam and radiographic imaging reviewed with patient today, with impression as above  Pathophysiology of back pain and treatment options discussed and patient agreeable to start formal PT that she was already referred  I have also prescribed naproxen 500mg PO BID and a TENS machine  I counseled she could also use acetaminophen 500-1000mg PO Q6-8H  Follow up in 6 weeks, if no improvement or worsening symptoms I will order further imaging  Return in about 6 weeks (around 4/21/2021)  CHIEF COMPLAINT:  Back pain    HPI:  Myrna Gutierrez is a 44 y o  female  who presents in [facility] to consultation from her PCP, Dr Eugene Patel, for       Visit 03/10/2021 :  Initial evaluation low back pain: Ongoing for approximately 5 years no precipitating injury but with progressive worsening in the past year  Pain is located across lumbar aspect of back without radiation  Pain described as aching, intermittent, randomly aggravates, moderate intensity  Patient unsure what aggravates pain as it occurs randomly  Uses advil/tylenol alternatively with minimal to no relief  Uses a friends TENs machine with some relief  Denies numbness/tingling, bowel/bladder incontinence, weakness, unintentional weight loss, and other ROS  She had been referred to PT already by her PCP, but wanted to review her symptoms today       Burning pain of the feet but has been seen by podiatry where she had injections    Review of Systems   Constitutional: Negative for chills, fatigue, fever and unexpected weight change  HENT: Negative for sore throat  Respiratory: Negative for cough and shortness of breath  Gastrointestinal: Negative for abdominal pain, nausea and vomiting  Denies bowel incontinence   Genitourinary:        Denies urinary incontinence   Musculoskeletal:        As per HPI   Skin: Negative for rash and wound     Neurological:        As per HPI         Medical, Surgical, Family, and Social History    Past Medical History:   Diagnosis Date    Abnormal uterine bleeding (AUB)     spotting between periods    Anxiety     Depression     history of depression    Dysmenorrhea     Fibroid 10/17/2019    Insomnia     Recurrent pregnancy loss, antepartum condition or complication     Tubal pregnancy     Uterine polyp     Wears glasses      Past Surgical History:   Procedure Laterality Date    AUGMENTATION MAMMAPLASTY Bilateral 2009    BREAST SURGERY      biopsy; implants     SECTION      EYE SURGERY Right     lazy eye correction     HEMORROIDECTOMY      OR ELBOW ARTHROSCOP,EXTEN DEBRIDE Right 10/17/2016    Procedure: ARTHROSCOPY ELBOW WITH LATERAL EPICONDYLE DEBRIDMENT; ECRB RELEASE;  Surgeon: Tracee Dominguez MD;  Location: [facility];  Service: Orthopedics    OR HYSTEROSCOPY,W/ENDO BX N/A 2016    Procedure: DILATATION AND CURETTAGE (D&C) WITH HYSTEROSCOPY POLYPECTOMY;  Surgeon: Rhonda Niño MD;  Location: [facility];  Service: Gynecology    WISDOM TOOTH EXTRACTION       Social History   Social History     Substance and Sexual Activity   Alcohol Use Yes    Comment: socially     Social History     Substance and Sexual Activity   Drug Use No     Social History     Tobacco Use   Smoking Status Current Every Day Smoker    Packs/day: 1 00    Years: 4 00    Pack years: 4 00   Smokeless Tobacco Never Used   Tobacco Comment    quit for 20yrs but started smoking again 4yr ago Family History   Problem Relation Age of Onset    Hypertension Mother     Rheum arthritis Father     Hypertension Sister     Lung cancer Maternal Grandmother [de-identified]    Breast cancer Paternal Grandmother 27    Cancer Maternal Aunt 39    No Known Problems Daughter     No Known Problems Daughter     No Known Problems Maternal Grandfather     No Known Problems Paternal Grandfather     No Known Problems Maternal Aunt      Allergies   Allergen Reactions    Amoxicillin Abdominal Pain    Augmentin [Amoxicillin-Pot Clavulanate] GI Intolerance     [facility] use of cefazolin for 0R on 10/17/16 ( Dr Meneses Form)   Judithe Spruce Clavulanic Acid Abdominal Pain    Phenobarbital Other (See Comments) and Delirium     hallucinations          Physical Exam  /87   Pulse 85   Ht 5' 1" (1 549 m)   Wt 72 1 kg (159 lb)   BMI 30 04 kg/m²     Constitutional:  see vital signs  Gen: obese (BMI 30), normocephalic/atraumatic, well-groomed  Eyes: No inflammation or discharge of conjunctiva or lids; sclera clear   Pharynx: no inflammation, lesion, or mass of lips  Neck: supple, no masses, non-distended  MSK: no inflammation, lesion, mass, or clubbing of nails and digits except for other than mentioned below  SKIN: no visible rashes or skin lesions  Pulmonary/Chest: Effort normal  No respiratory distress     NEURO: cranial nerves grossly intact  PSYCH:  Alert and oriented to person, place, and time; recent and remote memory intact; mood normal, no depression, anxiety, or agitation, judgment and insight good and intact     Ortho Exam  BACK EXAM:  Gait: normal, no trendelenberg gait, no antalgic gait    BACK TENDERNESS:  Spinous Processes: +L5  Paraspinal Muscles: +bilateral paralumbar  SI Joint: no  Sacrum: no    ROM:  Flexion: 90, aggravates low back pain  Extension: 20, aggravates low back pain  Lateral flexion: 20 bilaterally  Rotation: 20 bilaterally    DERMATOMAL SENSATION:  L1: normal   L2: normal   L3: normal   L4: normal   L5: normal S1: normal    STRENGTH (bilateral):  Knee Extension: 5/5  Knee Flexion: 5/5  Foot Dorsiflexion: 5/5  Great Toe Extension: 5/5  Foot Plantarflexion: 5/5  Hip Flexion: 5/5  Hip Abduction: 5/5    REFLEXES:  Patellar: 2+ bilateral  Achilles: 2+ bilateral  Clonus: negative bilateral    BACK:   SUPINE STRAIGHT LEG: negative bilaterally    HIP:  LOG ROLL: negative  BECKIE: negative  FADIR: negative

## 2021-03-25 DIAGNOSIS — Z23 ENCOUNTER FOR IMMUNIZATION: ICD-10-CM

## 2021-03-29 ENCOUNTER — IMMUNIZATIONS (OUTPATIENT)
Dept: FAMILY MEDICINE CLINIC | Facility: HOSPITAL | Age: 51
End: 2021-03-29

## 2021-03-29 DIAGNOSIS — Z23 ENCOUNTER FOR IMMUNIZATION: Primary | ICD-10-CM

## 2021-03-29 PROCEDURE — 0001A SARS-COV-2 / COVID-19 MRNA VACCINE (PFIZER-BIONTECH) 30 MCG: CPT

## 2021-03-29 PROCEDURE — 91300 SARS-COV-2 / COVID-19 MRNA VACCINE (PFIZER-BIONTECH) 30 MCG: CPT

## 2021-04-09 ENCOUNTER — HOSPITAL ENCOUNTER (OUTPATIENT)
Dept: MAMMOGRAPHY | Facility: MEDICAL CENTER | Age: 51
Discharge: HOME | End: 2021-04-09
Payer: COMMERCIAL

## 2021-04-09 VITALS — HEIGHT: 61 IN | BODY MASS INDEX: 30.02 KG/M2 | WEIGHT: 159 LBS

## 2021-04-09 DIAGNOSIS — Z12.31 ENCOUNTER FOR SCREENING MAMMOGRAM FOR BREAST CANCER: ICD-10-CM

## 2021-04-09 PROCEDURE — 77063 BREAST TOMOSYNTHESIS BI: CPT

## 2021-04-09 PROCEDURE — 77067 SCR MAMMO BI INCL CAD: CPT

## 2021-04-13 DIAGNOSIS — R92.2 DENSE BREAST TISSUE ON MAMMOGRAM: Primary | ICD-10-CM

## 2021-04-21 ENCOUNTER — IMMUNIZATIONS (OUTPATIENT)
Dept: FAMILY MEDICINE CLINIC | Facility: HOSPITAL | Age: 51
End: 2021-04-21

## 2021-04-21 DIAGNOSIS — Z23 ENCOUNTER FOR IMMUNIZATION: Primary | ICD-10-CM

## 2021-04-21 PROCEDURE — 0002A SARS-COV-2 / COVID-19 MRNA VACCINE (PFIZER-BIONTECH) 30 MCG: CPT

## 2021-04-21 PROCEDURE — 91300 SARS-COV-2 / COVID-19 MRNA VACCINE (PFIZER-BIONTECH) 30 MCG: CPT

## 2021-05-04 ENCOUNTER — OFFICE VISIT (OUTPATIENT)
Dept: PHYSICAL THERAPY | Facility: REHABILITATION | Age: 51
End: 2021-05-04
Payer: COMMERCIAL

## 2021-05-04 DIAGNOSIS — G89.29 CHRONIC LOW BACK PAIN, UNSPECIFIED BACK PAIN LATERALITY, UNSPECIFIED WHETHER SCIATICA PRESENT: Primary | ICD-10-CM

## 2021-05-04 DIAGNOSIS — M54.50 CHRONIC LOW BACK PAIN, UNSPECIFIED BACK PAIN LATERALITY, UNSPECIFIED WHETHER SCIATICA PRESENT: Primary | ICD-10-CM

## 2021-05-04 PROCEDURE — 97112 NEUROMUSCULAR REEDUCATION: CPT | Performed by: PHYSICAL THERAPIST

## 2021-05-04 PROCEDURE — 97161 PT EVAL LOW COMPLEX 20 MIN: CPT | Performed by: PHYSICAL THERAPIST

## 2021-05-04 NOTE — PROGRESS NOTES
PT Evaluation     Today's date: 2021  Patient name: Brittny Mcallister  : 1970  MRN: [id number]  Referring provider: Yaron Mackay PT  Dx:   Encounter Diagnosis     ICD-10-CM    1  Chronic low back pain, unspecified back pain laterality, unspecified whether sciatica present  M54 5     G89 29        Start Time: 1515  Stop Time: 1615  Total time in clinic (min): 60 minutes    Assessment  Assessment details: Pt is a 44year old female who presents to outpatient PT direct access for acute on chronic low back pain  Pt demonstrates impairments including increased low back pain, limited lumbar ROM, decreased proximal hip strength and impaired functional mobility with pain and difficulty with ADL's and sitting and standing tolerance  Pt will benefit from skilled PT to address above impairments and maximize function  Impairments: abnormal or restricted ROM, activity intolerance, impaired physical strength, lacks appropriate home exercise program and pain with function    Symptom irritability: lowUnderstanding of Dx/Px/POC: good   Prognosis: good    Goals  Impairment Goals  - Pt will demonstrate decreased pain to 5/10 at worst  - Pt will demonstrate painfree lumbar ROM  - Pt will demonstrated increased LE strength all planes to >4+/5 b/l  Functional Goals  - Pt will demonstrate ability to perform bed mobility and supine to sit transfers   - Pt will demonstrate ability to dress without pain  -Pt will demonstrate ability  to sit x 30 mins without increase in pain at work       Plan  Plan details: Cont per POC x 30 days as per direct access     Patient would benefit from: skilled physical therapy  Planned modality interventions: low level laser therapy, TENS, thermotherapy: hydrocollator packs and cryotherapy  Planned therapy interventions: joint mobilization, manual therapy, neuromuscular re-education, patient education, strengthening, stretching, therapeutic activities, therapeutic exercise, home exercise program, functional ROM exercises, flexibility, activity modification, abdominal trunk stabilization and balance  Frequency: 2x week  Duration in weeks: 4  Plan of Care beginning date: 5/4/2021  Plan of Care expiration date: 6/2/2021  Treatment plan discussed with: patient        Subjective Evaluation    History of Present Illness  Mechanism of injury: Pt comes to PT direct access for low back pain  Chronic low back pain for years with acute flare up 2 days ago  Pt reports the flare-ups are happening more frequently  Pt reports she was playing with her grandson and noticed the pain later on  Pt reports when she has flare-ups the pain is always the same  Pt reports L lower back pain and L buttocks pain  Pt denies N/T in LE's  Pt reports pain is sometimes on her right side if it lasts a while  Pt has seen a chiropractor in the past 6 weeks  Pt originally saw her PCP for this problem in November  Pt was then referred to ortho  No diagnostic imaging was performed  Ortho recommended PT however pt pursued chiropractic treatment first  Pt has seen chiropractor 2x/week for the past 6 weeks  She reports some improvement with that  Pt has TENS/MHP and adjustments when at her treatments  Pt is now down to once every 1 5 weeks  L low back pain/buttocks pain: 2-7/10, pulled muscle feeling  Relieved with lying supine with L knee bent and R LE extended or lying in sidelying  Heat, ice, stretch, naproxen    Aggravating factors: bending forward>bending backwards, dressing, lifting left leg to put pants on, bending to put socks on, sit to stand transfers, supine to sit transfer, bed mobility, car transfers, negotiating steps reciprocally, sitting> 10 mins    Walking feels better     Standing is limited by prior history of foot pain b/l  Pt had history of tarsal tunnel in her feet     Social Support  Steps to enter house: no  Stairs in house: no   Lives in: [place]  Lives with: spouse and young children    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[email or web address])    Diagnostic Tests  No diagnostic tests performed  Treatments  Previous treatment: medication and chiropractic  Current treatment: physical therapy  Patient Goals  Patient goal: not have flare-ups anymore         Objective     Concurrent Complaints  Positive for disturbed sleep  Negative for night pain, bladder dysfunction, bowel dysfunction and saddle (S4) numbness    Additional Special Questions  Disturbed sleep from changing positions     Postural Observations    Additional Postural Observation Details  L iliac crest and ASIS inferior compared to R     Tenderness     Lumbar Spine  Tenderness in the spinous process  Left Hip   Tenderness in the PSIS       Additional Tenderness Details  TTP L3-L5  TTP piriformis muscle on L     Neurological Testing     Reflexes   Left   Patellar (L4): normal (2+)    Right   Patellar (L4): normal (2+)    Additional Neurological Details  Unable to elicit achilles reflex b/l this session     Active Range of Motion     Lumbar   Flexion:  WFL and with pain  Extension:  WFL and with pain  Left lateral flexion:  Restriction level: minimal  Right lateral flexion:  with pain Restriction level: minimal  Left rotation:  WFL  Right rotation:  with pain Restriction level: minimal    Additional Active Range of Motion Details  Pain on L with R lateral flexion and R rotation    Strength/Myotome Testing     Left Hip   Planes of Motion   Flexion: 4  Extension: 3+  Abduction: 3+  External rotation: 4  Internal rotation: 5    Right Hip   Planes of Motion   Flexion: 4+  Extension: 4+  Abduction: 4-  External rotation: 4+  Internal rotation: 4+    Left Knee   Flexion: 5  Extension: 5    Right Knee   Flexion: 5  Extension: 5    Left Ankle/Foot   Dorsiflexion: 4+  Great toe extension: 4+    Right Ankle/Foot   Dorsiflexion: 4+  Great toe extension: 4+    Additional Strength Details  Back pain with resisted seated hip flexion on L    Painfree ASLR, 4/5 L, 4+/5 R Tests     Lumbar   Positive prone instability  and sacroiliac distraction   Left   Negative passive SLR and slump test      Right   Negative passive SLR and slump test      Left Pelvic Girdle/Sacrum   Negative: thigh thrust and active SLR test      Right Pelvic Girdle/Sacrum   Negative: thigh thrust and active SLR test      Left Hip   Positive FADIR  Negative BECKIE and long sit  Right Hip   Negative BECKIE, FADIR and long sit       Additional Tests Details  (+) piriformis on L     General Comments:      Lumbar Comments  Able to HR/TR  Able to uni HR x1 b/l   SLS 30 secs b/l, (+) trendelenberg on R              Precautions: depression, anxiety  Daily Treatment Diary    Manuals 5/4                                                 Neuro Re-Ed          SLS           TAC 4 mins         TAC with ball squeeze 5"x10         TAC with bridge          TAC with alt knee fall out 1x5 ea         TAC with march          TAC with SLR          TAC with s/l ABD          TAC with clamshells                              Ther Ex          Bike                    LTR          Supine piri stretch          Seated fig 4 stretch          Standing hip ABD          Standing SLR          Standing march          HR/TR                              Ther Activity          Log roll                    Squatting          LSU and over          FSU and over                     Gait Training                              Modalities

## 2021-05-06 ENCOUNTER — OFFICE VISIT (OUTPATIENT)
Dept: PHYSICAL THERAPY | Facility: REHABILITATION | Age: 51
End: 2021-05-06
Payer: COMMERCIAL

## 2021-05-06 ENCOUNTER — APPOINTMENT (OUTPATIENT)
Dept: PHYSICAL THERAPY | Facility: REHABILITATION | Age: 51
End: 2021-05-06
Payer: COMMERCIAL

## 2021-05-06 DIAGNOSIS — M54.50 CHRONIC LOW BACK PAIN, UNSPECIFIED BACK PAIN LATERALITY, UNSPECIFIED WHETHER SCIATICA PRESENT: Primary | ICD-10-CM

## 2021-05-06 DIAGNOSIS — G89.29 CHRONIC LOW BACK PAIN, UNSPECIFIED BACK PAIN LATERALITY, UNSPECIFIED WHETHER SCIATICA PRESENT: Primary | ICD-10-CM

## 2021-05-06 PROCEDURE — 97140 MANUAL THERAPY 1/> REGIONS: CPT | Performed by: PHYSICAL THERAPIST

## 2021-05-06 PROCEDURE — 97110 THERAPEUTIC EXERCISES: CPT | Performed by: PHYSICAL THERAPIST

## 2021-05-06 PROCEDURE — 97112 NEUROMUSCULAR REEDUCATION: CPT | Performed by: PHYSICAL THERAPIST

## 2021-05-12 ENCOUNTER — OFFICE VISIT (OUTPATIENT)
Dept: PHYSICAL THERAPY | Facility: REHABILITATION | Age: 51
End: 2021-05-12
Payer: COMMERCIAL

## 2021-05-12 DIAGNOSIS — M54.50 CHRONIC LOW BACK PAIN, UNSPECIFIED BACK PAIN LATERALITY, UNSPECIFIED WHETHER SCIATICA PRESENT: Primary | ICD-10-CM

## 2021-05-12 DIAGNOSIS — G89.29 CHRONIC LOW BACK PAIN, UNSPECIFIED BACK PAIN LATERALITY, UNSPECIFIED WHETHER SCIATICA PRESENT: Primary | ICD-10-CM

## 2021-05-12 PROCEDURE — 97112 NEUROMUSCULAR REEDUCATION: CPT | Performed by: PHYSICAL THERAPIST

## 2021-05-12 PROCEDURE — 97110 THERAPEUTIC EXERCISES: CPT | Performed by: PHYSICAL THERAPIST

## 2021-05-12 NOTE — PROGRESS NOTES
Daily Note     Today's date: 2021  Patient name: Chi Smith  : 1970  MRN: [id number]  Referring provider: Ban Fuentes, PT  Dx:   Encounter Diagnosis     ICD-10-CM    1  Chronic low back pain, unspecified back pain laterality, unspecified whether sciatica present  M54 5     G89 29        Start Time: 1215  Stop Time: 1253  Total time in clinic (min): 38 minutes    Subjective:  Pt reports she is getting better, less pain  Pain at worst 2/10  Pain is less frequent  Objective: See treatment diary below      Assessment: Tolerated treatment well  Patient exhibited good technique with therapeutic exercises  Pt presents with improved control with TAC and maintaining with LE exercises  Pt most challenged with bridge with occasional compensation with low back movement  Pt will benefit from progression of POC to include CKC exercises to progress core stabilization in standing  Plan: Continue per plan of care         Precautions: depression, anxiety  Daily Treatment Diary    Manuals        L piriformis STM   8 mins 2 mins                                      Neuro Re-Ed          SLS           TAC 4 mins 10"x5 10"x5       TAC with ball squeeze 5"x10 5"x10 See below       TAC with bridge  3"x7 5" x11 With ball squeeze       TAC with alt knee fall out 1x5 ea 1x10 ea x15 ea       TAC with march  1x10 ea x15 ea       TAC with SLR   2x5 ea       TAC with s/l ABD   1x5 ea       TAC with clamshells  5"x10 ea 5"x15 ea       Dying bug                     Ther Ex          Bike   5 mins                 LTR  10"x5 ea HEP       Supine piri stretch  20"x3 ea review       Seated HS stretch   20"x2 ea HEP       Seated fig 4 stretch  20"x2 ea  HEP       Standing hip ABD          Standing SLR          Standing march          HR/TR                              Ther Activity          Log roll  2 mins                   Squatting          LSU and over          FSU and over                     Gait Training Modalities

## 2021-05-17 ENCOUNTER — APPOINTMENT (OUTPATIENT)
Dept: PHYSICAL THERAPY | Facility: REHABILITATION | Age: 51
End: 2021-05-17
Payer: COMMERCIAL

## 2021-05-19 ENCOUNTER — OFFICE VISIT (OUTPATIENT)
Dept: PHYSICAL THERAPY | Facility: REHABILITATION | Age: 51
End: 2021-05-19
Payer: COMMERCIAL

## 2021-05-19 DIAGNOSIS — M54.50 CHRONIC LOW BACK PAIN, UNSPECIFIED BACK PAIN LATERALITY, UNSPECIFIED WHETHER SCIATICA PRESENT: Primary | ICD-10-CM

## 2021-05-19 DIAGNOSIS — G89.29 CHRONIC LOW BACK PAIN, UNSPECIFIED BACK PAIN LATERALITY, UNSPECIFIED WHETHER SCIATICA PRESENT: Primary | ICD-10-CM

## 2021-05-19 PROCEDURE — 97140 MANUAL THERAPY 1/> REGIONS: CPT | Performed by: PHYSICAL THERAPIST

## 2021-05-19 PROCEDURE — 97112 NEUROMUSCULAR REEDUCATION: CPT | Performed by: PHYSICAL THERAPIST

## 2021-05-19 PROCEDURE — 97110 THERAPEUTIC EXERCISES: CPT | Performed by: PHYSICAL THERAPIST

## 2021-05-19 NOTE — PROGRESS NOTES
Daily Note     Today's date: 2021  Patient name: Ugo Suarez  : 1970  MRN: [id number]  Referring provider: Jaimee Mendoza, PT  Dx:   Encounter Diagnosis     ICD-10-CM    1  Chronic low back pain, unspecified back pain laterality, unspecified whether sciatica present  M54 5     G89 29        Start Time: 1216  Stop Time: 1257  Total time in clinic (min): 41 minutes    Subjective: Pt reports she is feeling "pretty good" She is doing her exercises faithfully and she feels like she is getting stronger  Pt gets pain when she is tired but it recovers quickly  Objective: See treatment diary below      Assessment: Tolerated treatment well  Patient required vc/tc to perform hip hike to neutral to avoid compensation with lateral lean during standing hip ABD/SLR  Pt able to  Complete standing exercises without increase in low back pain  Pt more challenged with SLS L>R requiring vc/tc to avoid compensation with bracing her leg's against each other or performing hip drop  Pt is progressing well with increased core stability and LE strength  Plan: Continue per plan of care        Precautions: depression, anxiety  Daily Treatment Diary    Manuals       L piriformis STM   8 mins 2 mins  8 mins                                     Neuro Re-Ed          SLS     30"x2 ea      TAC 4 mins 10"x5 10"x5 With exercises      TAC with ball squeeze 5"x10 5"x10 See below       TAC with bridge  3"x7 5" x11 With ball squeeze HEP      TAC with alt knee fall out 1x5 ea 1x10 ea x15 ea HEP      TAC with march  1x10 ea x15 ea HEP      TAC with SLR   2x5 ea x20 ea      TAC with s/l ABD   1x5 ea x10 ea      TAC with clamshells  5"x10 ea 5"x15 ea HEP      Dying bug     1x5 LE's only, heel taps                 Ther Ex          Bike   5 mins 5 mins                LTR  10"x5 ea HEP       Supine piri stretch  20"x3 ea review       Seated HS stretch   20"x2 ea HEP       Seated fig 4 stretch  20"x2 ea  HEP       Hip hike    x5 ea      Standing hip ABD    1x10 ea      Standing SLR    1x10 ea      Standing march    1x10 ea      HR/TR    1x10 ea                          Ther Activity          Log roll  2 mins                   Squatting          LSU and over          FSU and over                     Gait Training                              Modalities

## 2021-05-21 ENCOUNTER — OFFICE VISIT (OUTPATIENT)
Dept: PHYSICAL THERAPY | Facility: REHABILITATION | Age: 51
End: 2021-05-21
Payer: COMMERCIAL

## 2021-05-21 DIAGNOSIS — G89.29 CHRONIC LOW BACK PAIN, UNSPECIFIED BACK PAIN LATERALITY, UNSPECIFIED WHETHER SCIATICA PRESENT: Primary | ICD-10-CM

## 2021-05-21 DIAGNOSIS — M54.50 CHRONIC LOW BACK PAIN, UNSPECIFIED BACK PAIN LATERALITY, UNSPECIFIED WHETHER SCIATICA PRESENT: Primary | ICD-10-CM

## 2021-05-21 PROCEDURE — 97140 MANUAL THERAPY 1/> REGIONS: CPT | Performed by: PHYSICAL THERAPIST

## 2021-05-21 PROCEDURE — 97530 THERAPEUTIC ACTIVITIES: CPT | Performed by: PHYSICAL THERAPIST

## 2021-05-21 PROCEDURE — 97110 THERAPEUTIC EXERCISES: CPT | Performed by: PHYSICAL THERAPIST

## 2021-05-21 NOTE — PROGRESS NOTES
Daily Note     Today's date: 2021  Patient name: Brittny Mcallister  : 1970  MRN: [id number]  Referring provider: Yaron Mackay, PT  Dx:   Encounter Diagnosis     ICD-10-CM    1  Chronic low back pain, unspecified back pain laterality, unspecified whether sciatica present  M54 5     G89 29        Start Time: 1238  Stop Time: 1317  Total time in clinic (min): 39 minutes    Subjective: Pt cone to PT 8 mins late  Pt reports her low back is feeling much better  She did not have any pain this morning when she woke up  Objective: See treatment diary below      Assessment: Tolerated treatment well  Patient exhibited good technique with therapeutic exercises  Pt required vc/tc to maintain TAC with sit to stand and mini squats to maintain neutral spine  Pt also required cueing to keep weight shifted posteriorly during squat to avoid ant translation of knees over toes  Plan: Continue per plan of care        Precautions: depression, anxiety  Daily Treatment Diary    Manuals      L piriformis STM   8 mins 2 mins  8 mins  8 mins                                   Neuro Re-Ed          SLS     30"x2 ea 30"x2 ea     TAC 4 mins 10"x5 10"x5 With exercises With exercises     TAC with ball squeeze 5"x10 5"x10 See below       TAC with bridge  3"x7 5" x11 With ball squeeze HEP      TAC with alt knee fall out 1x5 ea 1x10 ea x15 ea HEP      TAC with march  1x10 ea x15 ea HEP      TAC with SLR   2x5 ea x20 ea HEP     TAC with s/l ABD   1x5 ea x10 ea HEP     TAC with clamshells  5"x10 ea 5"x15 ea HEP      Dying bug     1x5 LE's only, heel taps  nv               Ther Ex          Bike   5 mins 5 mins 5 mins L2                LTR  10"x5 ea HEP       Supine piri stretch  20"x3 ea review       Seated HS stretch   20"x2 ea HEP       Seated fig 4 stretch  20"x2 ea  HEP       Hip hike    x5 ea x5 ea     Standing hip ABD    1x10 ea x15 ea     Standing SLR    1x10 ea x15 ea     Standing march    1x10 ea x15 ea HR/TR    1x10 ea x15 ea     Side Steps          Monster Walks           Ther Activity          Log roll  2 mins         Sit to stand     4 mins     Squatting     4 mins     LSU and over          FSU and over                     Gait Training                              Modalities

## 2021-05-25 ENCOUNTER — OFFICE VISIT (OUTPATIENT)
Dept: PHYSICAL THERAPY | Facility: REHABILITATION | Age: 51
End: 2021-05-25
Payer: COMMERCIAL

## 2021-05-25 DIAGNOSIS — M54.50 CHRONIC LOW BACK PAIN, UNSPECIFIED BACK PAIN LATERALITY, UNSPECIFIED WHETHER SCIATICA PRESENT: Primary | ICD-10-CM

## 2021-05-25 DIAGNOSIS — G89.29 CHRONIC LOW BACK PAIN, UNSPECIFIED BACK PAIN LATERALITY, UNSPECIFIED WHETHER SCIATICA PRESENT: Primary | ICD-10-CM

## 2021-05-25 PROCEDURE — 97140 MANUAL THERAPY 1/> REGIONS: CPT | Performed by: PHYSICAL THERAPIST

## 2021-05-25 PROCEDURE — 97110 THERAPEUTIC EXERCISES: CPT | Performed by: PHYSICAL THERAPIST

## 2021-05-25 NOTE — PROGRESS NOTES
PT Discharge    Today's date: 2021  Patient name: Rafael Magana  : 1970  MRN: [id number]  Referring provider: Daisy Roblero PT  Dx:   Encounter Diagnosis     ICD-10-CM    1  Chronic low back pain, unspecified back pain laterality, unspecified whether sciatica present  M54 5     G89 29        Start Time: 1745  Stop Time: 181  Total time in clinic (min): 33 minutes    Assessment  Assessment details: Pt is a 44year old female who presents to outpatient PT direct access for acute on chronic low back pain  Pt presents with increased AROM, increased proximal hip strength, decreased pain as well as overall improved function with ADL's and standing, walking, and transfers  Pt still remains with mild tenderness to palpation over L>R piriformis  Pt is independent with HEP at this time and has met LTG's  Pt will be d/c'ed from skilled PT  Pt is in agreement with this POC  Impairments: abnormal or restricted ROM, activity intolerance, impaired physical strength, lacks appropriate home exercise program and pain with function    Symptom irritability: lowUnderstanding of Dx/Px/POC: good   Prognosis: good    Goals  Impairment Goals  - Pt will demonstrate decreased pain to 5/10 at worst - MET  - Pt will demonstrate painfree lumbar ROM - MET  - Pt will demonstrated increased LE strength all planes to >4+/5 b/l - MET  Functional Goals  - Pt will demonstrate ability to perform bed mobility and supine to sit transfers - MET  - Pt will demonstrate ability to dress without pain - MET  -Pt will demonstrate ability  to sit x 30 mins without increase in pain at work - MET       Plan  Plan details: Pt d/c'ed from skilled PT     Patient would benefit from: skilled physical therapy  Planned modality interventions: low level laser therapy, TENS, thermotherapy: hydrocollator packs and cryotherapy  Planned therapy interventions: joint mobilization, manual therapy, neuromuscular re-education, patient education, strengthening, stretching, therapeutic activities, therapeutic exercise, home exercise program, functional ROM exercises, flexibility, activity modification, abdominal trunk stabilization and balance  Plan of Care beginning date: 5/4/2021  Plan of Care expiration date: 6/2/2021  Treatment plan discussed with: patient        Subjective Evaluation    History of Present Illness  Mechanism of injury: Pt reports 100% improvement     Pt reports 0/10 pain  No pain in AM   Pt able to perform all ADL's without pain  Pt reports compliance with HEP   Social Support  Steps to enter house: no  Stairs in house: no   Lives in: [facility]  Lives with: spouse and young children    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)    Diagnostic Tests  No diagnostic tests performed  Treatments  Previous treatment: medication and chiropractic  Current treatment: physical therapy  Patient Goals  Patient goal: not have flare-ups anymore - MET        Objective     Concurrent Complaints  Negative for night pain, disturbed sleep, bladder dysfunction, bowel dysfunction and saddle (S4) numbness    Postural Observations  Standing posture: good        Tenderness     Lumbar Spine  No tenderness in the spinous process  Left Hip   No tenderness in the PSIS       Additional Tenderness Details  (-) TTP L3-L5  TTP piriformis muscle on L>R    Active Range of Motion     Lumbar   Flexion:  WFL  Extension:  WFL  Left lateral flexion:  WFL  Right lateral flexion:  WFL  Left rotation:  WFL  Right rotation:  [facility]    Strength/Myotome Testing     Left Hip   Planes of Motion   Flexion: 4+  Extension: 4+  Abduction: 4+  External rotation: 5  Internal rotation: 5    Right Hip   Planes of Motion   Flexion: 4+  Extension: 4+  Abduction: 4+  External rotation: 5  Internal rotation: 5    Left Knee   Flexion: 5  Extension: 5    Right Knee   Flexion: 5  Extension: 5    Left Ankle/Foot   Dorsiflexion: 5  Great toe extension: 4+    Right Ankle/Foot   Dorsiflexion: 5  Great toe extension: 4+    Tests     Lumbar   Negative sacroiliac distraction  Left   Negative passive SLR and slump test      Right   Negative passive SLR and slump test      Left Pelvic Girdle/Sacrum   Negative: thigh thrust and active SLR test      Right Pelvic Girdle/Sacrum   Negative: thigh thrust and active SLR test      Left Hip   Negative BECKIE, FADIR and long sit  Right Hip   Negative BECKIE, FADIR and long sit       Additional Tests Details  (+) piriformis on L     General Comments:      Lumbar Comments  Able to HR/TR  Able to uni HR x1 b/l   SLS 30 secs b/l, (-) trendelenberg on R              Precautions: depression, anxiety  Daily Treatment Diary: Bike 10 mins , Re-eval/measurements: 23 mins

## 2021-05-27 ENCOUNTER — APPOINTMENT (OUTPATIENT)
Dept: PHYSICAL THERAPY | Facility: REHABILITATION | Age: 51
End: 2021-05-27
Payer: COMMERCIAL

## 2021-06-07 DIAGNOSIS — G89.29 CHRONIC BILATERAL LOW BACK PAIN WITHOUT SCIATICA: ICD-10-CM

## 2021-06-07 DIAGNOSIS — M54.50 CHRONIC BILATERAL LOW BACK PAIN WITHOUT SCIATICA: ICD-10-CM

## 2021-06-10 RX ORDER — NAPROXEN 500 MG/1
500 TABLET ORAL 2 TIMES DAILY WITH MEALS
Qty: 60 TABLET | Refills: 1 | Status: SHIPPED | OUTPATIENT
Start: 2021-06-10 | End: 2021-10-12

## 2021-08-03 ENCOUNTER — TELEMEDICINE (OUTPATIENT)
Dept: FAMILY MEDICINE CLINIC | Facility: CLINIC | Age: 51
End: 2021-08-03
Payer: COMMERCIAL

## 2021-08-03 DIAGNOSIS — H10.023 PINK EYE DISEASE OF BOTH EYES: Primary | ICD-10-CM

## 2021-08-03 PROCEDURE — 99213 OFFICE O/P EST LOW 20 MIN: CPT | Performed by: INTERNAL MEDICINE

## 2021-08-03 RX ORDER — OFLOXACIN 3 MG/ML
1 SOLUTION/ DROPS OPHTHALMIC 4 TIMES DAILY
Qty: 5 ML | Refills: 0 | Status: SHIPPED | OUTPATIENT
Start: 2021-08-03 | End: 2021-08-08

## 2021-08-03 NOTE — PROGRESS NOTES
Virtual Brief Visit    Verification of patient location:    Patient is located in the following state in which I hold an active license [place]      Assessment/Plan:    Problem List Items Addressed This Visit     None      Visit Diagnoses     Pink eye disease of both eyes    -  Primary    Relevant Medications    ofloxacin (OCUFLOX) 0 3 % ophthalmic solution      Will use ofloxacin ophthalmic solution 3-4 times a day for 5 days, she was instructed to call me if her symptoms will not resolve  Reason for visit is   Chief Complaint   Patient presents with    Virtual Brief Visit        Encounter provider Brant Frost MD    Provider located at [address] [facility] [address]  [phone]    Recent Visits  No visits were found meeting these conditions  Showing recent visits within past 7 days and meeting all other requirements  Today's Visits  Date Type Provider Dept   08/03/21 Nohemi Mojica MD [place] [address] [facility]   Showing today's visits and meeting all other requirements  Future Appointments  No visits were found meeting these conditions  Showing future appointments within next 150 days and meeting all other requirements       After connecting through telephone, the patient was identified by name and date of birth  Vicki Villavicencio was informed that this is a telemedicine visit and that the visit is being conducted through telephone  My office door was closed  No one else was in the room  She acknowledged consent and understanding of privacy and security of the platform  The patient has agreed to participate and understands she can discontinue the visit at any time  Patient is aware this is a billable service  Subjective    Vicki Villavicencio is a 44 y o  female    Patient called today with complaints of redness in her eyes and scant discharge that started recently after she had a grandson who was visiting her  Her grandson was recently diagnosed with clarence           Past Medical History:   Diagnosis Date    Abnormal uterine bleeding (AUB)     spotting between periods    Anxiety     Depression     history of depression    Dysmenorrhea     Fibroid 10/17/2019    Insomnia     Recurrent pregnancy loss, antepartum condition or complication     Tubal pregnancy     Uterine polyp     Wears glasses        Past Surgical History:   Procedure Laterality Date    AUGMENTATION MAMMAPLASTY Bilateral 2009    BREAST SURGERY      biopsy; implants     SECTION      EYE SURGERY Right     lazy eye correction     HEMORROIDECTOMY      AK ELBOW ARTHROSCOP,EXTEN DEBRIDE Right 10/17/2016    Procedure: ARTHROSCOPY ELBOW WITH LATERAL EPICONDYLE DEBRIDMENT; ECRB RELEASE;  Surgeon: Megan Hernandez MD;  Location: [facility];  Service: Orthopedics    AK HYSTEROSCOPY,W/ENDO BX N/A 2016    Procedure: DILATATION AND CURETTAGE (D&C) WITH HYSTEROSCOPY POLYPECTOMY;  Surgeon: Leonel Greene MD;  Location: [facility];  Service: Gynecology    WISDOM TOOTH EXTRACTION         Current Outpatient Medications   Medication Sig Dispense Refill    eszopiclone (LUNESTA) 3 MG tablet TAKE 1 TABLET DAILY AT BEDTIME, TAKE IMMEDIATELY BEFORE BEDTIME 90 tablet 1    LORazepam (ATIVAN) 1 mg tablet Take 0 5 tablets (0 5 mg total) by mouth daily as needed for anxiety 90 tablet 0    Multiple Vitamin (MULTI VITAMIN DAILY PO) Take 3 tablets by mouth daily gummies       naproxen (NAPROSYN) 500 mg tablet TAKE 1 TABLET (500 MG TOTAL) BY MOUTH 2 (TWO) TIMES A DAY WITH MEALS 60 tablet 1    Nerve Stimulator (TENS Therapy Pain Relief) RAMIN Use 1 Device 3 (three) times a day as needed (Apply up to 15 minutes per session as needed for pain) 1 Device 0    nicotine (NICODERM CQ) 21 mg/24 hr TD 24 hr patch Place 1 patch on the skin every 24 hours 28 patch 0    ofloxacin (OCUFLOX) 0 3 % ophthalmic solution Administer 1 drop to both eyes 4 (four) times a day for 5 days 5 mL 0     No current facility-administered medications for this visit  Allergies   Allergen Reactions    Amoxicillin Abdominal Pain    Augmentin [Amoxicillin-Pot Clavulanate] GI Intolerance     md harris's use of cefazolin for 0R on 10/17/16 ( Dr Michelle Marquis)   Areta Emmer Clavulanic Acid Abdominal Pain    Phenobarbital Other (See Comments) and Delirium     hallucinations       Review of Systems   Constitutional: Negative for chills and fever  Eyes: Positive for discharge and redness  Negative for visual disturbance  There were no vitals filed for this visit  I spent 5 minutes directly with the patient during this visit    VIRTUAL VISIT [address] verbally agrees to participate in [employer]  Pt is aware that [employer] could be limited without vital signs or the ability to perform a full hands-on physical exam  Lizy Bailey understands she or the provider may request at any time to terminate the video visit and request the patient to seek care or treatment in person

## 2021-09-10 DIAGNOSIS — G47.00 INSOMNIA, UNSPECIFIED TYPE: ICD-10-CM

## 2021-09-10 RX ORDER — ESZOPICLONE 3 MG/1
3 TABLET, FILM COATED ORAL
Qty: 90 TABLET | Refills: 0 | Status: SHIPPED | OUTPATIENT
Start: 2021-09-10 | End: 2021-12-14

## 2021-10-07 ENCOUNTER — OFFICE VISIT (OUTPATIENT)
Dept: URGENT CARE | Facility: CLINIC | Age: 51
End: 2021-10-07
Payer: COMMERCIAL

## 2021-10-07 VITALS
HEIGHT: 61 IN | HEART RATE: 89 BPM | BODY MASS INDEX: 31.15 KG/M2 | TEMPERATURE: 97.8 F | WEIGHT: 165 LBS | RESPIRATION RATE: 20 BRPM | OXYGEN SATURATION: 98 %

## 2021-10-07 DIAGNOSIS — Z91.89 AT INCREASED RISK OF EXPOSURE TO COVID-19 VIRUS: ICD-10-CM

## 2021-10-07 DIAGNOSIS — B34.9 VIRAL INFECTION: Primary | ICD-10-CM

## 2021-10-07 PROCEDURE — S9083 URGENT CARE CENTER GLOBAL: HCPCS | Performed by: PHYSICIAN ASSISTANT

## 2021-10-07 PROCEDURE — U0003 INFECTIOUS AGENT DETECTION BY NUCLEIC ACID (DNA OR RNA); SEVERE ACUTE RESPIRATORY SYNDROME CORONAVIRUS 2 (SARS-COV-2) (CORONAVIRUS DISEASE [COVID-19]), AMPLIFIED PROBE TECHNIQUE, MAKING USE OF HIGH THROUGHPUT TECHNOLOGIES AS DESCRIBED BY CMS-2020-01-R: HCPCS | Performed by: PHYSICIAN ASSISTANT

## 2021-10-07 PROCEDURE — G0382 LEV 3 HOSP TYPE B ED VISIT: HCPCS | Performed by: PHYSICIAN ASSISTANT

## 2021-10-07 PROCEDURE — U0005 INFEC AGEN DETEC AMPLI PROBE: HCPCS | Performed by: PHYSICIAN ASSISTANT

## 2021-10-07 RX ORDER — BENZONATATE 200 MG/1
200 CAPSULE ORAL 3 TIMES DAILY PRN
Qty: 20 CAPSULE | Refills: 0 | Status: SHIPPED | OUTPATIENT
Start: 2021-10-07

## 2021-10-08 ENCOUNTER — TELEPHONE (OUTPATIENT)
Dept: FAMILY MEDICINE CLINIC | Facility: CLINIC | Age: 51
End: 2021-10-08

## 2021-10-08 LAB — SARS-COV-2 RNA RESP QL NAA+PROBE: POSITIVE

## 2021-10-09 ENCOUNTER — DOCUMENTATION (OUTPATIENT)
Dept: FAMILY MEDICINE CLINIC | Facility: CLINIC | Age: 51
End: 2021-10-09

## 2021-10-12 ENCOUNTER — PATIENT MESSAGE (OUTPATIENT)
Dept: FAMILY MEDICINE CLINIC | Facility: CLINIC | Age: 51
End: 2021-10-12

## 2021-10-12 ENCOUNTER — TELEMEDICINE (OUTPATIENT)
Dept: FAMILY MEDICINE CLINIC | Facility: CLINIC | Age: 51
End: 2021-10-12
Payer: COMMERCIAL

## 2021-10-12 DIAGNOSIS — U07.1 COVID-19 VIRUS INFECTION: Primary | ICD-10-CM

## 2021-10-12 PROCEDURE — 99214 OFFICE O/P EST MOD 30 MIN: CPT | Performed by: FAMILY MEDICINE

## 2021-10-25 ENCOUNTER — ANNUAL EXAM (OUTPATIENT)
Dept: OBGYN CLINIC | Facility: CLINIC | Age: 51
End: 2021-10-25
Payer: COMMERCIAL

## 2021-10-25 VITALS
BODY MASS INDEX: 29.83 KG/M2 | WEIGHT: 158 LBS | SYSTOLIC BLOOD PRESSURE: 118 MMHG | HEIGHT: 61 IN | DIASTOLIC BLOOD PRESSURE: 78 MMHG

## 2021-10-25 DIAGNOSIS — Z01.419 WOMEN'S ANNUAL ROUTINE GYNECOLOGICAL EXAMINATION: Primary | ICD-10-CM

## 2021-10-25 DIAGNOSIS — Z12.31 ENCOUNTER FOR SCREENING MAMMOGRAM FOR BREAST CANCER: ICD-10-CM

## 2021-10-25 DIAGNOSIS — N95.2 VAGINAL ATROPHY: ICD-10-CM

## 2021-10-25 DIAGNOSIS — D21.9 FIBROID: ICD-10-CM

## 2021-10-25 PROCEDURE — 99396 PREV VISIT EST AGE 40-64: CPT | Performed by: OBSTETRICS & GYNECOLOGY

## 2021-10-25 PROCEDURE — 4004F PT TOBACCO SCREEN RCVD TLK: CPT | Performed by: OBSTETRICS & GYNECOLOGY

## 2021-10-25 PROCEDURE — 3008F BODY MASS INDEX DOCD: CPT | Performed by: OBSTETRICS & GYNECOLOGY

## 2021-12-14 DIAGNOSIS — G47.00 INSOMNIA, UNSPECIFIED TYPE: ICD-10-CM

## 2021-12-14 RX ORDER — ESZOPICLONE 3 MG/1
TABLET, FILM COATED ORAL
Qty: 90 TABLET | Refills: 0 | Status: SHIPPED | OUTPATIENT
Start: 2021-12-14 | End: 2022-03-16

## 2022-01-22 ENCOUNTER — IMMUNIZATIONS (OUTPATIENT)
Dept: FAMILY MEDICINE CLINIC | Facility: HOSPITAL | Age: 52
End: 2022-01-22

## 2022-01-22 DIAGNOSIS — Z23 ENCOUNTER FOR IMMUNIZATION: Primary | ICD-10-CM

## 2022-01-22 PROCEDURE — 0001A COVID-19 PFIZER VACC 0.3 ML: CPT

## 2022-01-22 PROCEDURE — 91300 COVID-19 PFIZER VACC 0.3 ML: CPT

## 2022-03-15 DIAGNOSIS — G47.00 INSOMNIA, UNSPECIFIED TYPE: ICD-10-CM

## 2022-03-16 RX ORDER — ESZOPICLONE 3 MG/1
TABLET, FILM COATED ORAL
Qty: 90 TABLET | Refills: 0 | Status: SHIPPED | OUTPATIENT
Start: 2022-03-16 | End: 2022-06-28 | Stop reason: SDUPTHER

## 2022-05-03 ENCOUNTER — HOSPITAL ENCOUNTER (OUTPATIENT)
Dept: MAMMOGRAPHY | Facility: MEDICAL CENTER | Age: 52
Discharge: HOME | End: 2022-05-03

## 2022-05-03 VITALS — WEIGHT: 156.53 LBS | BODY MASS INDEX: 29.55 KG/M2 | HEIGHT: 61 IN

## 2022-05-03 DIAGNOSIS — Z12.31 ENCOUNTER FOR SCREENING MAMMOGRAM FOR BREAST CANCER: ICD-10-CM

## 2022-06-07 ENCOUNTER — HOSPITAL ENCOUNTER (OUTPATIENT)
Dept: MAMMOGRAPHY | Facility: CLINIC | Age: 52
Discharge: HOME | End: 2022-06-07
Payer: COMMERCIAL

## 2022-06-07 ENCOUNTER — HOSPITAL ENCOUNTER (OUTPATIENT)
Dept: ULTRASOUND IMAGING | Facility: CLINIC | Age: 52
Discharge: HOME | End: 2022-06-07
Payer: COMMERCIAL

## 2022-06-07 VITALS — HEIGHT: 61 IN | WEIGHT: 156.53 LBS | BODY MASS INDEX: 29.55 KG/M2

## 2022-06-07 DIAGNOSIS — N64.4 BREAST PAIN: ICD-10-CM

## 2022-06-07 PROCEDURE — 76642 ULTRASOUND BREAST LIMITED: CPT

## 2022-06-07 PROCEDURE — G0279 TOMOSYNTHESIS, MAMMO: HCPCS

## 2022-06-07 PROCEDURE — 77066 DX MAMMO INCL CAD BI: CPT

## 2022-07-06 ENCOUNTER — TELEPHONE (OUTPATIENT)
Dept: FAMILY MEDICINE CLINIC | Facility: CLINIC | Age: 52
End: 2022-07-06

## 2022-07-06 NOTE — TELEPHONE ENCOUNTER
----- Message from Matthew Phelan PA-C sent at 7/3/2022  5:58 PM EDT -----  Refill on Lunesta approved for this patient of Dr Casey Van  Please call her to schedule an appointment to establish with one of us ASAP as she needs to be seen prior to further refills  Her last physical was almost 2 years ago  She has only had sick/acute visits since then

## 2022-08-24 ENCOUNTER — OFFICE VISIT (OUTPATIENT)
Dept: FAMILY MEDICINE CLINIC | Facility: CLINIC | Age: 52
End: 2022-08-24
Payer: COMMERCIAL

## 2022-08-24 VITALS
RESPIRATION RATE: 18 BRPM | HEART RATE: 84 BPM | OXYGEN SATURATION: 96 % | HEIGHT: 61 IN | BODY MASS INDEX: 30.96 KG/M2 | DIASTOLIC BLOOD PRESSURE: 86 MMHG | WEIGHT: 164 LBS | TEMPERATURE: 97 F | SYSTOLIC BLOOD PRESSURE: 124 MMHG

## 2022-08-24 DIAGNOSIS — J01.00 ACUTE NON-RECURRENT MAXILLARY SINUSITIS: Primary | ICD-10-CM

## 2022-08-24 DIAGNOSIS — Z11.59 NEED FOR HEPATITIS C SCREENING TEST: ICD-10-CM

## 2022-08-24 DIAGNOSIS — F17.200 CURRENT EVERY DAY SMOKER: ICD-10-CM

## 2022-08-24 DIAGNOSIS — R03.0 ELEVATED BLOOD PRESSURE READING IN OFFICE WITHOUT DIAGNOSIS OF HYPERTENSION: ICD-10-CM

## 2022-08-24 DIAGNOSIS — E78.2 MIXED HYPERLIPIDEMIA: ICD-10-CM

## 2022-08-24 DIAGNOSIS — R05.9 COUGH: ICD-10-CM

## 2022-08-24 PROCEDURE — 87811 SARS-COV-2 COVID19 W/OPTIC: CPT | Performed by: FAMILY MEDICINE

## 2022-08-24 PROCEDURE — 3725F SCREEN DEPRESSION PERFORMED: CPT | Performed by: FAMILY MEDICINE

## 2022-08-24 PROCEDURE — 99214 OFFICE O/P EST MOD 30 MIN: CPT | Performed by: FAMILY MEDICINE

## 2022-08-24 RX ORDER — PROMETHAZINE HYDROCHLORIDE AND CODEINE PHOSPHATE 6.25; 1 MG/5ML; MG/5ML
SYRUP ORAL
Qty: 118 ML | Refills: 0 | Status: SHIPPED | OUTPATIENT
Start: 2022-08-24 | End: 2022-10-27 | Stop reason: ALTCHOICE

## 2022-08-24 RX ORDER — AZITHROMYCIN 250 MG/1
TABLET, FILM COATED ORAL
Qty: 6 TABLET | Refills: 0 | Status: SHIPPED | OUTPATIENT
Start: 2022-08-24 | End: 2022-08-29

## 2022-08-24 NOTE — PATIENT INSTRUCTIONS
Fasting labs  Patient to call for results if he/she does not hear from us    Return for full PE    Take antibiotic as directed  Eat a yogurt while taking antibiotic  Flonase and afrin nasal spray as needed  Recommendation to increase fluids - particularly water, rest, saline nasal spray and humidified air  Phenergan with codeine at bedtime if needed; do not take with ativan  Robitussin DM for coughing during the day      Wellness Visit for Adults   AMBULATORY CARE:   A wellness visit  is when you see your healthcare provider to get screened for health problems  Your healthcare provider will also give you advice on how to stay healthy  Write down your questions so you remember to ask them  Ask your healthcare provider how often you should have a wellness visit  What happens at a wellness visit:  Your healthcare provider will ask about your health, and your family history of health problems  This includes high blood pressure, heart disease, and cancer  He or she will ask if you have symptoms that concern you, if you smoke, and about your mood  You may also be asked about your intake of medicines, supplements, food, and alcohol  Any of the following may be done: Your weight  will be checked  Your height may also be checked so your body mass index (BMI) can be calculated  Your BMI shows if you are at a healthy weight  Your blood pressure  and heart rate will be checked  Your temperature may also be checked  Blood and urine tests  may be done  Blood tests may be done to check your cholesterol levels  Abnormal cholesterol levels increase your risk for heart disease and stroke  You may also need a blood or urine test to check for diabetes if you are at increased risk  Urine tests may be done to look for signs of an infection or kidney disease  A physical exam  includes checking your heartbeat and lungs with a stethoscope  Your healthcare provider may also check your skin to look for sun damage      Screening tests  may be recommended  A screening test is done to check for diseases that may not cause symptoms  The screening tests you may need depend on your age, gender, family history, and lifestyle habits  For example, colorectal screening may be recommended if you are 48years old or older  Screening tests you need if you are a woman:   A Pap smear  is used to screen for cervical cancer  Pap smears are usually done every 3 to 5 years depending on your age  You may need them more often if you have had abnormal Pap smear test results in the past  Ask your healthcare provider how often you should have a Pap smear  A mammogram  is an x-ray of your breasts to screen for breast cancer  Experts recommend mammograms every 2 years starting at age 48 years  You may need a mammogram at age 52 years or younger if you have an increased risk for breast cancer  Talk to your healthcare provider about when you should start having mammograms and how often you need them  Vaccines you may need:   Get an influenza vaccine  every year  The influenza vaccine protects you from the flu  Several types of viruses cause the flu  The viruses change over time, so new vaccines are made each year  Get a tetanus-diphtheria (Td) booster vaccine  every 10 years  This vaccine protects you against tetanus and diphtheria  Tetanus is a severe infection that may cause painful muscle spasms and lockjaw  Diphtheria is a severe bacterial infection that causes a thick covering in the back of your mouth and throat  Get a human papillomavirus (HPV) vaccine  if you are female and aged 23 to 32 or male 23 to 24 and never received it  This vaccine protects you from HPV infection  HPV is the most common infection spread by sexual contact  HPV may also cause vaginal, penile, and anal cancers  Get a pneumococcal vaccine  if you are aged 72 years or older  The pneumococcal vaccine is an injection given to protect you from pneumococcal disease  Pneumococcal disease is an infection caused by pneumococcal bacteria  The infection may cause pneumonia, meningitis, or an ear infection  Get a shingles vaccine  if you are 60 or older, even if you have had shingles before  The shingles vaccine is an injection to protect you from the varicella-zoster virus  This is the same virus that causes chickenpox  Shingles is a painful rash that develops in people who had chickenpox or have been exposed to the virus  How to eat healthy:  My Plate is a model for planning healthy meals  It shows the types and amounts of foods that should go on your plate  Fruits and vegetables make up about half of your plate, and grains and protein make up the other half  A serving of dairy is included on the side of your plate  The amount of calories and serving sizes you need depends on your age, gender, weight, and height  Examples of healthy foods are listed below:  Eat a variety of vegetables  such as dark green, red, and orange vegetables  You can also include canned vegetables low in sodium (salt) and frozen vegetables without added butter or sauces  Eat a variety of fresh fruits , canned fruit in 100% juice, frozen fruit, and dried fruit  Include whole grains  At least half of the grains you eat should be whole grains  Examples include whole-wheat bread, wheat pasta, brown rice, and whole-grain cereals such as oatmeal     Eat a variety of protein foods such as seafood (fish and shellfish), lean meat, and poultry without skin (turkey and chicken)  Examples of lean meats include pork leg, shoulder, or tenderloin, and beef round, sirloin, tenderloin, and extra lean ground beef  Other protein foods include eggs and egg substitutes, beans, peas, soy products, nuts, and seeds  Choose low-fat dairy products such as skim or 1% milk or low-fat yogurt, cheese, and cottage cheese  Limit unhealthy fats  such as butter, hard margarine, and shortening         Exercise:  Exercise at least 30 minutes per day on most days of the week  Some examples of exercise include walking, biking, dancing, and swimming  You can also fit in more physical activity by taking the stairs instead of the elevator or parking farther away from stores  Include muscle strengthening activities 2 days each week  Regular exercise provides many health benefits  It helps you manage your weight, and decreases your risk for type 2 diabetes, heart disease, stroke, and high blood pressure  Exercise can also help improve your mood  Ask your healthcare provider about the best exercise plan for you  General health and safety guidelines:   Do not smoke  Nicotine and other chemicals in cigarettes and cigars can cause lung damage  Ask your healthcare provider for information if you currently smoke and need help to quit  E-cigarettes or smokeless tobacco still contain nicotine  Talk to your healthcare provider before you use these products  Limit alcohol  A drink of alcohol is 12 ounces of beer, 5 ounces of wine, or 1½ ounces of liquor  Lose weight, if needed  Being overweight increases your risk of certain health conditions  These include heart disease, high blood pressure, type 2 diabetes, and certain types of cancer  Protect your skin  Do not sunbathe or use tanning beds  Use sunscreen with a SPF 15 or higher  Apply sunscreen at least 15 minutes before you go outside  Reapply sunscreen every 2 hours  Wear protective clothing, hats, and sunglasses when you are outside  Drive safely  Always wear your seatbelt  Make sure everyone in your car wears a seatbelt  A seatbelt can save your life if you are in an accident  Do not use your cell phone when you are driving  This could distract you and cause an accident  Pull over if you need to make a call or send a text message  Practice safe sex  Use latex condoms if are sexually active and have more than one partner   Your healthcare provider may recommend screening tests for sexually transmitted infections (STIs)  Wear helmets, lifejackets, and protective gear  Always wear a helmet when you ride a bike or motorcycle, go skiing, or play sports that could cause a head injury  Wear protective equipment when you play sports  Wear a lifejacket when you are on a boat or doing water sports  © Copyright [employer] 2022 Information is for End User's use only and may not be sold, redistributed or otherwise used for commercial purposes  All illustrations and images included in CareNotes® are the copyrighted property of [employer]  or [facility] [employer] Villegas   The above information is an  only  It is not intended as medical advice for individual conditions or treatments  Talk to your doctor, nurse or pharmacist before following any medical regimen to see if it is safe and effective for you

## 2022-08-24 NOTE — PROGRESS NOTES
FAMILY PRACTICE OFFICE VISIT    NAME: Mone Francis    AGE: 46 y o  SEX: female  : 1970   MRN: [id number]    DATE: 2022  TIME: 6:09 PM    Assessment and Plan   1  Annual physical exam  Deferred PE until next visit as today's encounter changed to acute    2  Need for hepatitis C screening test    - Hepatitis C antibody; Future    3  Mixed hyperlipidemia  Due for labs - then return for full PE    - Comprehensive metabolic panel; Future  - Lipid panel; Future  - TSH, 3rd generation; Future      5  Elevated blood pressure reading in office without diagnosis of hypertension  Will have pt monitor and return for shortterm f/u    6  Current every day smoker  Urge complete cessation  Discussed that tob use can increase risk of respiratory infections, as well as prolong them    7  Cough    - POCT Rapid Covid Ag    8  Acute non-recurrent maxillary sinusitis  See below  Antibiotic  And Recommendation to increase fluids - particularly water, rest, saline nasal spray and humidified air  covid test in office (-)    Pt is not a candidate for CT chest for lung cancer screening  As she smoked only for a few years as a teen and then started again X 5 years ago  Urge tob cessation    Patient instructions:  Fasting labs  Patient to call for results if he/she does not hear from us    Return for full PE    Take antibiotic as directed  Eat a yogurt while taking antibiotic  Flonase and afrin nasal spray as needed  Recommendation to increase fluids - particularly water, rest, saline nasal spray and humidified air  Phenergan with codeine at bedtime if needed; do not take with ativan  Robitussin DM for coughing during the day  Chief Complaint     Chief Complaint   Patient presents with    Physical Exam       History of Present Illness   Mone Francis is a 46y o -year-old female who presents today for PE - but is not feeling well    Started X 1 5 weeks ago with coughing - upon return from vacation  Changed visit to acute visit and pt in agreement to returning for full PE    Pt did not check covid test at home      Review of Systems   Review of Systems   Constitutional: Positive for fatigue  Negative for chills, diaphoresis and fever  No body aches     HENT: Positive for congestion and postnasal drip  Negative for sore throat  No loss of taste or smell  Respiratory: Positive for cough and wheezing  Negative for shortness of breath  Taking otc coriciden cough and cold  And some left over cough med with codeine  No h/o asthma  Pt is a smoker  Pt is able to expectorate     Cardiovascular: Negative for chest pain, palpitations and leg swelling  Gastrointestinal: Negative for abdominal pain, constipation, diarrhea, nausea and vomiting  Genitourinary: Negative for dysuria         Active Problem List     Patient Active Problem List   Diagnosis    Anxiety    Hyperlipidemia    Insomnia    Lateral epicondylitis of right elbow    Notalgia paresthetica    Current every day smoker    Fibroid    Irregular bleeding    Bilateral foot pain    Overweight with body mass index (BMI) of 29 to 29 9 in adult    Vaginal atrophy    Menopause    Chronic bilateral low back pain without sciatica         Past Medical History:  Past Medical History:   Diagnosis Date    Abnormal uterine bleeding (AUB)     spotting between periods    Anxiety     Depression     history of depression    Dysmenorrhea     Fibroid 10/17/2019    Insomnia     Recurrent pregnancy loss, antepartum condition or complication     Tubal pregnancy     Uterine polyp     Wears glasses        Past Surgical History:  Past Surgical History:   Procedure Laterality Date    AUGMENTATION MAMMAPLASTY Bilateral 2009    BREAST SURGERY      biopsy; implants     SECTION      EYE SURGERY Right     lazy eye correction     HEMORROIDECTOMY      [place] ELBOW ARTHROSCOP,EXTEN DEBRIDE Right 10/17/2016    Procedure: ARTHROSCOPY ELBOW WITH LATERAL EPICONDYLE DEBRIDMENT; ECRB RELEASE;  Surgeon: Avelino Alan MD;  [facility];  Service: Orthopedics    [place] HYSTEROSCOPY,W/ENDO BX N/A 11/22/2016    Procedure: DILATATION AND CURETTAGE (D&C) WITH HYSTEROSCOPY POLYPECTOMY;  Surgeon: Gt Ware MD;  [facility];  Service: Gynecology    WISDOM TOOTH EXTRACTION         Family History:  Family History   Problem Relation Age of Onset    Hypertension Mother     Rheum arthritis Father     Hypertension Sister     Lung cancer Maternal Grandmother [de-identified]    Breast cancer Paternal Grandmother 27    Cancer Maternal Aunt 39    No Known Problems Daughter     No Known Problems Daughter     No Known Problems Maternal Grandfather     No Known Problems Paternal Grandfather     No Known Problems Maternal Aunt        Social History:  Social History     Socioeconomic History    Marital status: /Civil Union     Spouse name: Not on file    Number of children: Not on file    Years of education: Not on file    Highest education level: Not on file   Occupational History    Not on file   Tobacco Use    Smoking status: Current Every Day Smoker     Packs/day: 1 00     Years: 4 00     Pack years: 4 00    Smokeless tobacco: Never Used    Tobacco comment: quit for 20yrs but started smoking again 4yr ago   Vaping Use    Vaping Use: Never used   Substance and Sexual Activity    Alcohol use: Yes     Comment: socially    Drug use: No    Sexual activity: Yes     Birth control/protection: None, Male Sterilization   Other Topics Concern    Not on file   Social History Narrative    Not on file     Social Determinants of Health     Financial Resource Strain: Not on file   Food Insecurity: Not on file   Transportation Needs: Not on file   Physical Activity: Not on file   Stress: Not on file   Social Connections: Not on file   Intimate Partner Violence: Not on file   Housing Stability: Not on file       Objective     Vitals:    08/24/22 1742   BP: 124/86   Pulse: 84 Resp: 18   Temp: (!) 97 °F (36 1 °C)   SpO2: 96%     Wt Readings from Last 3 Encounters:   08/24/22 74 4 kg (164 lb)   06/07/22 71 kg (156 lb 8 4 oz)   05/03/22 71 kg (156 lb 8 4 oz)       Physical Exam  Vitals and nursing note reviewed  Constitutional:       General: She is not in acute distress  Appearance: Normal appearance  She is not ill-appearing or toxic-appearing  HENT:      Right Ear: Tympanic membrane normal       Left Ear: Tympanic membrane normal       Nose: Nose normal  No congestion  Comments: Tenderness over maxillary sinuses       Mouth/Throat:      Mouth: Mucous membranes are moist       Pharynx: No oropharyngeal exudate or posterior oropharyngeal erythema  Comments: Mild PND  Mucous membranes moist and pink      Eyes:      General: No scleral icterus  Extraocular Movements: Extraocular movements intact  Pupils: Pupils are equal, round, and reactive to light  Cardiovascular:      Rate and Rhythm: Normal rate and regular rhythm  Pulses: Normal pulses  Heart sounds: Normal heart sounds  No murmur heard  Pulmonary:      Effort: Pulmonary effort is normal  No respiratory distress  Breath sounds: Normal breath sounds  No wheezing, rhonchi or rales  Comments: Loose sounding cough during visit  No use of accessory respiratory muscles    Musculoskeletal:      Cervical back: Neck supple  Right lower leg: No edema  Left lower leg: No edema  Lymphadenopathy:      Cervical: No cervical adenopathy  Skin:     General: Skin is warm  Coloration: Skin is not jaundiced  Neurological:      General: No focal deficit present  Mental Status: She is alert and oriented to person, place, and time  Psychiatric:         Mood and Affect: Mood normal          Behavior: Behavior normal          Thought Content:  Thought content normal          Judgment: Judgment normal          Pertinent Laboratory/Diagnostic Studies:  Lab Results   Component Value Date    GLUCOSE 80 11/25/2015    BUN 18 11/25/2015    CREATININE 0 70 11/25/2015    CALCIUM 8 7 11/25/2015     11/25/2015    K 4 1 11/25/2015    CO2 26 11/25/2015     11/25/2015     Lab Results   Component Value Date    ALT 22 11/25/2015    AST 13 11/25/2015    ALKPHOS 49 11/25/2015    BILITOT 0 37 11/25/2015       Lab Results   Component Value Date    WBC 9 49 09/27/2016    HGB 15 1 09/27/2016    HCT 42 6 11/22/2016    MCV 97 09/27/2016     09/27/2016       No results found for: TSH    Lab Results   Component Value Date    CHOL 231 11/25/2015     Lab Results   Component Value Date    TRIG 59 11/25/2015     Lab Results   Component Value Date    HDL 72 11/25/2015     Lab Results   Component Value Date    LDLCALC 147 (H) 11/25/2015     No results found for: HGBA1C    Results for orders placed or performed in visit on 10/07/21   [facility]    Specimen: Nose; Nares   Result Value Ref Range    SARS-CoV-2 Positive (A) Negative       No orders of the defined types were placed in this encounter        ALLERGIES:  Allergies   Allergen Reactions    Amoxicillin Abdominal Pain    Augmentin [Amoxicillin-Pot Clavulanate] GI Intolerance     md harris's use of cefazolin for 0R on 10/17/16 ( Dr Genevieve Mahan)   [place] Clavulanic Acid Abdominal Pain    Phenobarbital Other (See Comments) and Delirium     hallucinations       Current Medications     Current Outpatient Medications   Medication Sig Dispense Refill    eszopiclone (LUNESTA) 3 MG tablet Take 1 tablet (3 mg total) by mouth daily at bedtime Take immediately before bedtime 90 tablet 0    LORazepam (ATIVAN) 1 mg tablet Take 0 5 tablets (0 5 mg total) by mouth daily as needed for anxiety 90 tablet 0    Multiple Vitamin (MULTI VITAMIN DAILY PO) Take 3 tablets by mouth daily gummies       benzonatate (TESSALON) 200 MG capsule Take 1 capsule (200 mg total) by mouth 3 (three) times a day as needed for cough (Patient not taking: Reported on 8/24/2022) 20 capsule 0     No current facility-administered medications for this visit  Health Maintenance     Health Maintenance   Topic Date Due    Hepatitis C Screening  Never done    PT PLAN OF CARE  Never done    HIV Screening  Never done    Osteoporosis Screening  Never done    BMI: Followup Plan  09/08/2021    Pneumococcal Vaccine: Pediatrics (0 to 5 Years) and At-Risk Patients (6 to 59 Years) (2 - PCV) 09/08/2021    COVID-19 Vaccine (4 - Booster for Pfizer series) 05/22/2022    Influenza Vaccine (1) 09/01/2022    Cervical Cancer Screening  10/17/2022    Annual Physical  10/25/2022    Breast Cancer Screening: Mammogram  06/07/2023    Depression Screening  08/24/2023    BMI: Adult  08/24/2023    Colorectal Cancer Screening  05/06/2027    DTaP,Tdap,and Td Vaccines (2 - Td or Tdap) 05/25/2027    HIB Vaccine  Aged Out    Hepatitis B Vaccine  Aged Out    IPV Vaccine  Aged Out    Hepatitis A Vaccine  Aged Out    Meningococcal ACWY Vaccine  Aged Out    HPV Vaccine  Aged Out     Immunization History   Administered Date(s) Administered    COVID-19 PFIZER VACCINE 0 3 ML IM 03/29/2021, 04/21/2021, 01/22/2022    Influenza Quadrivalent Preservative Free 3 years and older IM 12/08/2014    Influenza Quadrivalent, 6-35 Months IM 09/08/2016    Influenza, recombinant, quadrivalent,injectable, preservative free 12/03/2019, 11/25/2020    Influenza, seasonal, injectable 11/13/2007, 11/24/2008, 10/25/2013    Influenza, seasonal, injectable, preservative free 10/02/2009    Pneumococcal Polysaccharide PPV23 09/08/2020    Tdap 05/25/2017     BMI Counseling: Body mass index is 30 99 kg/m²   The BMI is above normal  Nutrition recommendations include decreasing portion sizes, encouraging healthy choices of fruits and vegetables, decreasing fast food intake, consuming healthier snacks, limiting drinks that contain sugar, moderation in carbohydrate intake, increasing intake of lean protein, reducing intake of saturated and trans fat and reducing intake of cholesterol  Exercise recommendations include exercising 3-5 times per week  No pharmacotherapy was ordered  Rationale for BMI follow-up plan is due to patient being overweight or obese  Discussed returning to regular exercise  Depression Screening and Follow-up Plan: Patient was screened for depression during today's encounter  They screened negative with a PHQ-2 score of 0          Maru Jalloh DO

## 2022-08-30 LAB
SARS-COV-2 AG UPPER RESP QL IA: NEGATIVE
VALID CONTROL: NORMAL

## 2022-10-27 ENCOUNTER — ANNUAL EXAM (OUTPATIENT)
Dept: GYNECOLOGY | Facility: CLINIC | Age: 52
End: 2022-10-27

## 2022-10-27 VITALS
WEIGHT: 167.2 LBS | HEIGHT: 61 IN | BODY MASS INDEX: 31.57 KG/M2 | DIASTOLIC BLOOD PRESSURE: 80 MMHG | SYSTOLIC BLOOD PRESSURE: 122 MMHG

## 2022-10-27 DIAGNOSIS — D21.9 FIBROID: ICD-10-CM

## 2022-10-27 DIAGNOSIS — Z01.419 WOMEN'S ANNUAL ROUTINE GYNECOLOGICAL EXAMINATION: Primary | ICD-10-CM

## 2022-10-27 DIAGNOSIS — N95.2 VAGINAL ATROPHY: ICD-10-CM

## 2022-10-27 DIAGNOSIS — Z11.51 SCREENING FOR HUMAN PAPILLOMAVIRUS (HPV): ICD-10-CM

## 2022-10-27 DIAGNOSIS — Z12.31 ENCOUNTER FOR SCREENING MAMMOGRAM FOR BREAST CANCER: ICD-10-CM

## 2022-10-27 PROCEDURE — G0476 HPV COMBO ASSAY CA SCREEN: HCPCS | Performed by: OBSTETRICS & GYNECOLOGY

## 2022-10-27 NOTE — PROGRESS NOTES
Assessment/Plan   Diagnoses and all orders for this visit:    Women's annual routine gynecological examination  -     Liquid-based pap, screening    Encounter for screening mammogram for breast cancer  -     Mammo screening bilateral w 3d & cad; Future    Vaginal atrophy    Fibroid    Screening for human papillomavirus (HPV)  -     Liquid-based pap, screening    1  Yearly exam-Pap smear done with HPV testing, self-breast awareness reviewed, calcium/vitamin-D recommendations discussed, mammogram request given, had colonoscopy 20, follow-up in 5 years as per specialist   2  History uterine myoma-5 2 cm myoma noted on most recent imaging which was years ago  Uterus consistent with 6 week size, nontender and patient is asymptomatic  No intervention recommended  3  Menopausal-last menses 17  Denies any issues, to call with any  4  Mild vaginal atrophy-noted on exam   Patient counseled again about vaginal lubrication and vaginal lubrication/moisturizer sheet given  To use accordingly  5  Prior history irregular bleeding/endometrial polyp-no symptoms since D&C done   6  Contraception- had vasectomy  7  Other-daughter delivered through our practice  Patrick Pandya is almost 1years old, my congratulations given  Unfortunately, her partner  from drug overdose  Younger daughter was seen recently in the office for contraceptive management  My appreciation was given  Follow-up 1 year for yearly exam as needed  Subjective   Patient ID: Mortimer Saunders is a 45 y o  female      Vitals:    10/27/22 1523   BP: 122/80     HPI    The following portions of the patient's history were reviewed and updated as appropriate: allergies, current medications, past family history, past medical history, past social history, past surgical history and problem list   Past Medical History:   Diagnosis Date   • Abnormal uterine bleeding (AUB)     spotting between periods   • Anxiety    • Depression     history of depression   • Dysmenorrhea    • Fibroid 10/17/2019   • Insomnia    • Recurrent pregnancy loss, antepartum condition or complication    • Tubal pregnancy    • Uterine polyp    • Wears glasses      Past Surgical History:   Procedure Laterality Date   • AUGMENTATION MAMMAPLASTY Bilateral    • BREAST SURGERY      biopsy; implants   •  SECTION     • EYE SURGERY Right     lazy eye correction    • HEMORROIDECTOMY     • [place] ELBOW ARTHROSCOP,EXTEN DEBRIDE Right 10/17/2016    Procedure: ARTHROSCOPY ELBOW WITH LATERAL EPICONDYLE DEBRIDMENT; ECRB RELEASE;  Surgeon: Pily Aj MD;  [facility];  Service: Orthopedics   • [place] HYSTEROSCOPY,W/ENDO BX N/A 2016    Procedure: DILATATION AND CURETTAGE (D&C) WITH HYSTEROSCOPY POLYPECTOMY;  Surgeon: Mar Gleason MD;  [facility];  Service: Gynecology   • WISDOM TOOTH EXTRACTION       OB History    Para Term  AB Living   4 2 2   2 2   SAB IAB Ectopic Multiple Live Births           2      # Outcome Date GA Lbr Chris/2nd Weight Sex Delivery Anes PTL Lv   4 AB            3 AB            2 Term            1 Term                Current Outpatient Medications:   •  eszopiclone (LUNESTA) 3 MG tablet, Take 1 tablet (3 mg total) by mouth daily at bedtime Take immediately before bedtime, Disp: 90 tablet, Rfl: 0  •  LORazepam (ATIVAN) 1 mg tablet, Take 0 5 tablets (0 5 mg total) by mouth daily as needed for anxiety, Disp: 90 tablet, Rfl: 0  •  Multiple Vitamin (MULTI VITAMIN DAILY PO), Take 3 tablets by mouth daily gummies , Disp: , Rfl:   Allergies   Allergen Reactions   • Amoxicillin Abdominal Pain   • Augmentin [Amoxicillin-Pot Clavulanate] GI Intolerance     md ok's use of cefazolin for 0R on 10/17/16 ( Dr Beverly Ramesh)   • Clavulanic Acid Abdominal Pain   • Phenobarbital Other (See Comments) and Delirium     hallucinations     Social History     Socioeconomic History   • Marital status: /Civil Union     Spouse name: None   • Number of children: None   • Years of education: None   • Highest education level: None   Occupational History   • None   Tobacco Use   • Smoking status: Current Every Day Smoker     Packs/day: 1 00     Years: 4 00     Pack years: 4 00   • Smokeless tobacco: Never Used   • Tobacco comment: quit for 20yrs but started smoking again 4yr ago   Vaping Use   • Vaping Use: Never used   Substance and Sexual Activity   • Alcohol use: Yes     Comment: socially   • Drug use: No   • Sexual activity: Yes     Birth control/protection: None, Male Sterilization   Other Topics Concern   • None   Social History Narrative   • None     Social Determinants of Health     Financial Resource Strain: Not on file   Food Insecurity: Not on file   Transportation Needs: Not on file   Physical Activity: Not on file   Stress: Not on file   Social Connections: Not on file   Intimate Partner Violence: Not on file   Housing Stability: Not on file     Family History   Problem Relation Age of Onset   • Hypertension Mother    • Rheum arthritis Father    • Hypertension Sister    • Lung cancer Maternal Grandmother [de-identified]   • Breast cancer Paternal Grandmother 27   • Cancer Maternal Aunt 39   • No Known Problems Daughter    • No Known Problems Daughter    • No Known Problems Maternal Grandfather    • No Known Problems Paternal Grandfather    • No Known Problems Maternal Aunt        Review of Systems    Objective   Physical Exam  OBGyn Exam     Objective      /80 (BP Location: Left arm, Patient Position: Sitting, Cuff Size: Standard)   Ht 5' 1" (1 549 m)   Wt 75 8 kg (167 lb 3 2 oz)   LMP  (LMP Unknown)   BMI 31 59 kg/m²     General:   alert and oriented, in no acute distress   Neck: normal to inspection and palpation   Breast: normal appearance, no masses or tenderness   Heart:    Lungs:    Abdomen: soft, non-tender, without masses or organomegaly   Vulva: normal   Vagina: Mildly atrophic, without erythema or lesions or discharge     Cervix: Mildly atrophic, without lesions or discharge or cervicitis    No Cervical motion tenderness   Uterus: mid-position, non-tender, size consistent with Six weeks   Adnexa: no mass, fullness, tenderness   Rectum: negative    Psych:  Normal mood and affect   Skin:  Without obvious lesions   Eyes: symmetric, with normal movements and reactivity   Musculoskeletal:  Normal muscle tone and movements appreciated

## 2022-11-04 LAB
LAB AP GYN PRIMARY INTERPRETATION: NORMAL
Lab: NORMAL

## 2023-06-22 LAB
ALBUMIN SERPL-MCNC: 4.3 G/DL (ref 3.6–5.1)
ALBUMIN/GLOB SERPL: 1.7 (CALC) (ref 1–2.5)
ALP SERPL-CCNC: 57 U/L (ref 37–153)
ALT SERPL-CCNC: 12 U/L (ref 6–29)
AST SERPL-CCNC: 15 U/L (ref 10–35)
BILIRUB SERPL-MCNC: 0.5 MG/DL (ref 0.2–1.2)
BUN SERPL-MCNC: 17 MG/DL (ref 7–25)
BUN/CREAT SERPL: NORMAL (CALC) (ref 6–22)
CALCIUM SERPL-MCNC: 9.7 MG/DL (ref 8.6–10.4)
CHLORIDE SERPL-SCNC: 107 MMOL/L (ref 98–110)
CHOLEST SERPL-MCNC: 287 MG/DL
CHOLEST/HDLC SERPL: 4 (CALC)
CO2 SERPL-SCNC: 29 MMOL/L (ref 20–32)
CREAT SERPL-MCNC: 0.78 MG/DL (ref 0.5–1.03)
GFR/BSA.PRED SERPLBLD CYS-BASED-ARV: 90 ML/MIN/1.73M2
GLOBULIN SER CALC-MCNC: 2.5 G/DL (CALC) (ref 1.9–3.7)
GLUCOSE SERPL-MCNC: 85 MG/DL (ref 65–99)
HCV AB SERPL QL IA: NORMAL
HDLC SERPL-MCNC: 72 MG/DL
LDLC SERPL CALC-MCNC: 193 MG/DL (CALC)
NONHDLC SERPL-MCNC: 215 MG/DL (CALC)
POTASSIUM SERPL-SCNC: 4.1 MMOL/L (ref 3.5–5.3)
PROT SERPL-MCNC: 6.8 G/DL (ref 6.1–8.1)
SODIUM SERPL-SCNC: 142 MMOL/L (ref 135–146)
TRIGL SERPL-MCNC: 97 MG/DL
TSH SERPL-ACNC: 2.57 MIU/L

## 2023-06-24 NOTE — RESULT ENCOUNTER NOTE
Adolph condon  Your cholesterol is overall worsened compared to prior  Given the LDL over 190 - It is strongly recommended to begin statin therapy    Please schedule an appointment with me to discuss - along with rest of labs  Thanks - dr Anum Earl

## 2023-07-19 ENCOUNTER — RA CDI HCC (OUTPATIENT)
Dept: OTHER | Facility: HOSPITAL | Age: 53
End: 2023-07-19

## 2023-07-19 NOTE — PROGRESS NOTES
[address] [facility] coding opportunities       Chart reviewed, no opportunity found: CHART REVIEWED, NO OPPORTUNITY FOUND        Patients Insurance        Commercial Insurance: Sushil Sanders

## 2023-08-09 ENCOUNTER — OFFICE VISIT (OUTPATIENT)
Dept: URGENT CARE | Facility: CLINIC | Age: 53
End: 2023-08-09
Payer: COMMERCIAL

## 2023-08-09 VITALS
DIASTOLIC BLOOD PRESSURE: 82 MMHG | HEART RATE: 74 BPM | TEMPERATURE: 97.3 F | BODY MASS INDEX: 31.91 KG/M2 | SYSTOLIC BLOOD PRESSURE: 137 MMHG | OXYGEN SATURATION: 98 % | RESPIRATION RATE: 18 BRPM | HEIGHT: 61 IN | WEIGHT: 169 LBS

## 2023-08-09 DIAGNOSIS — J32.9 SINOBRONCHITIS: Primary | ICD-10-CM

## 2023-08-09 DIAGNOSIS — J40 SINOBRONCHITIS: Primary | ICD-10-CM

## 2023-08-09 PROCEDURE — S9083 URGENT CARE CENTER GLOBAL: HCPCS | Performed by: PHYSICIAN ASSISTANT

## 2023-08-09 PROCEDURE — G0382 LEV 3 HOSP TYPE B ED VISIT: HCPCS | Performed by: PHYSICIAN ASSISTANT

## 2023-08-09 RX ORDER — AZITHROMYCIN 500 MG/1
500 TABLET, FILM COATED ORAL DAILY
COMMUNITY
Start: 2023-06-10 | End: 2023-08-17

## 2023-08-09 RX ORDER — CEFUROXIME AXETIL 500 MG/1
500 TABLET ORAL EVERY 12 HOURS SCHEDULED
Qty: 14 TABLET | Refills: 0 | Status: SHIPPED | OUTPATIENT
Start: 2023-08-09 | End: 2023-08-16

## 2023-08-09 RX ORDER — FLUTICASONE PROPIONATE 50 MCG
SPRAY, SUSPENSION (ML) NASAL
COMMUNITY
Start: 2023-06-09

## 2023-08-09 RX ORDER — MONTELUKAST SODIUM 10 MG/1
10 TABLET ORAL EVERY EVENING
COMMUNITY
Start: 2023-06-09

## 2023-08-09 RX ORDER — PREDNISONE 20 MG/1
TABLET ORAL
Qty: 10 TABLET | Refills: 0 | Status: SHIPPED | OUTPATIENT
Start: 2023-08-09 | End: 2023-08-17

## 2023-08-09 NOTE — PROGRESS NOTES
[facility]    NAME: Dominga Christianson is a 45 y.o. female  : 1970    MRN: [id number]  DATE: 2023  TIME: 6:36 PM    Assessment and Plan   Sinobronchitis [J32.9, J40]  1. Sinobronchitis  cefuroxime (CEFTIN) 500 mg tablet    predniSONE 20 mg tablet          Patient Instructions   Patient Instructions   Take antibiotic as instructed. Do not start prednisone until tomorrow morning. Then take as instructed. While on prednisone do not take any ibuprofen or ibuprofen like products. Could safely take Tylenol in addition if needed. Nasal saline rinses every 1-2 hours while awake to help control nasal congestion and drainage. May continue Flonase before bed or 1 time a day. Follow-up with primary care at your upcoming appointment. Chief Complaint     Chief Complaint   Patient presents with   • Cold Like Symptoms     Patient with sinus pressure and congestin for 2.5 weeks       History of Present Illness   Dominga Christianson presents to the clinic c/o  75-year-old female comes in with complaint of sinus problems. Started: Over 2-1/2 weeks ago with nasty cough and postnasal drip. Associated signs and symptoms: Continued nasal congestion drainage postnasal drip and cough. Fatigue. No fever or chills at this time. Modifying factors: Has been using 2 sprays Flonase daily. Known Exposures: No known exposures. States she has had 3 sinus infections in the last year and a half. She has used telemed docs in the past.  She was prescribed a Z-Benja Steffanie 10, 2023. Denies any history of nasal septal deviation, facial trauma or nasal fractures. She wonders if she does not have asthma. She says she has allergies all year long. She is a smoker. Review of Systems   Review of Systems   Constitutional: Positive for activity change, appetite change and fatigue. Negative for chills and fever. HENT: Positive for congestion, postnasal drip, rhinorrhea and sinus pressure.  Negative for ear discharge, ear pain, sinus pain and sore throat. Eyes: Negative. Respiratory: Positive for cough and chest tightness. Negative for shortness of breath and wheezing. Cardiovascular: Negative. Hematological: Negative.         Current Medications     Long-Term Medications   Medication Sig Dispense Refill   • montelukast (SINGULAIR) 10 mg tablet Take 10 mg by mouth every evening     • eszopiclone (LUNESTA) 3 MG tablet Take 1 tablet (3 mg total) by mouth daily at bedtime Take immediately before bedtime; DO NOT TAKE WITH ATIVAN (Patient not taking: Reported on 2023) 90 tablet 0   • LORazepam (ATIVAN) 1 mg tablet Take 0.5 tablets (0.5 mg total) by mouth daily as needed for anxiety (Patient not taking: Reported on 2023) 90 tablet 0       Current Allergies     Allergies as of 2023 - Reviewed 2023   Allergen Reaction Noted   • Amoxicillin Abdominal Pain 2007   • Augmentin [amoxicillin-pot clavulanate] GI Intolerance 10/14/2016   • Clavulanic acid Abdominal Pain 2007   • Phenobarbital Other (See Comments) and Delirium 2007          The following portions of the patient's history were reviewed and updated as appropriate: allergies, current medications, past family history, past medical history, past social history, past surgical history and problem list.  Past Medical History:   Diagnosis Date   • Abnormal uterine bleeding (AUB)     spotting between periods   • Anxiety    • Depression     history of depression   • Dysmenorrhea    • Fibroid 10/17/2019   • Insomnia    • Recurrent pregnancy loss, antepartum condition or complication    • Tubal pregnancy    • Uterine polyp    • Wears glasses      Past Surgical History:   Procedure Laterality Date   • AUGMENTATION MAMMAPLASTY Bilateral    • BREAST SURGERY      biopsy; implants   •  SECTION     • EYE SURGERY Right     lazy eye correction    • HEMORROIDECTOMY     • [place] ARTHROSCOPY ELBOW SURGICAL DEBRIDEMENT EXTENSIVE Right 10/17/2016    Procedure: ARTHROSCOPY ELBOW WITH LATERAL EPICONDYLE DEBRIDMENT; ECRB RELEASE;  Surgeon: Sohail Chairez MD;  Location: [facility];  Service: Orthopedics   • [place] HYSTEROSCOPY BX ENDOMETRIUM&/POLYPC W/WO D&C N/A 11/22/2016    Procedure: DILATATION AND CURETTAGE (D&C) WITH HYSTEROSCOPY POLYPECTOMY;  Surgeon: Cyndie Carlson MD;  Location: [facility];  Service: Gynecology   • WISDOM TOOTH EXTRACTION       Family History   Problem Relation Age of Onset   • Hypertension Mother    • Rheum arthritis Father    • Hypertension Sister    • Lung cancer Maternal Grandmother 80   • Breast cancer Paternal Grandmother 27   • Cancer Maternal Aunt 39   • No Known Problems Daughter    • No Known Problems Daughter    • No Known Problems Maternal Grandfather    • No Known Problems Paternal Grandfather    • No Known Problems Maternal Aunt        Objective   /82   Pulse 74   Temp (!) 97.3 °F (36.3 °C) (Tympanic)   Resp 18   Ht 5' 1" (1.549 m)   Wt 76.7 kg (169 lb)   LMP  (LMP Unknown)   SpO2 98%   BMI 31.93 kg/m²   No LMP recorded (lmp unknown). Patient is postmenopausal.       Physical Exam     Physical Exam  Vitals and nursing note reviewed. Constitutional:       General: She is not in acute distress. Appearance: She is well-developed. She is ill-appearing. She is not toxic-appearing or diaphoretic. Comments: Appears mildly ill but in no acute distress. No trismus or conversational dyspnea. HENT:      Head: Normocephalic and atraumatic. Right Ear: Tympanic membrane, ear canal and external ear normal.      Left Ear: Tympanic membrane, ear canal and external ear normal.      Nose: Congestion and rhinorrhea present. Mouth/Throat:      Mouth: Mucous membranes are moist.      Pharynx: Posterior oropharyngeal erythema present. No oropharyngeal exudate. Comments: Cobblestoning posterior pharynx with patchy redness  Eyes:      General:         Right eye: No discharge.          Left eye: No discharge. Conjunctiva/sclera: Conjunctivae normal.      Pupils: Pupils are equal, round, and reactive to light. Cardiovascular:      Rate and Rhythm: Normal rate and regular rhythm. Heart sounds: Normal heart sounds. No murmur heard. No friction rub. No gallop. Pulmonary:      Effort: Pulmonary effort is normal. No respiratory distress. Breath sounds: Normal breath sounds. No stridor. No wheezing, rhonchi or rales. Musculoskeletal:      Cervical back: Normal range of motion and neck supple. No rigidity or tenderness. Lymphadenopathy:      Cervical: No cervical adenopathy. Skin:     General: Skin is warm and dry. Coloration: Skin is not jaundiced or pale. Findings: No rash. Neurological:      Mental Status: She is alert and oriented to person, place, and time.    Psychiatric:         Mood and Affect: Mood normal.         Behavior: Behavior normal.

## 2023-08-09 NOTE — PATIENT INSTRUCTIONS
Take antibiotic as instructed. Do not start prednisone until tomorrow morning. Then take as instructed. While on prednisone do not take any ibuprofen or ibuprofen like products. Could safely take Tylenol in addition if needed. Nasal saline rinses every 1-2 hours while awake to help control nasal congestion and drainage. May continue Flonase before bed or 1 time a day. Follow-up with primary care at your upcoming appointment.

## 2023-08-17 ENCOUNTER — OFFICE VISIT (OUTPATIENT)
Dept: FAMILY MEDICINE CLINIC | Facility: CLINIC | Age: 53
End: 2023-08-17
Payer: COMMERCIAL

## 2023-08-17 VITALS
HEIGHT: 61 IN | HEART RATE: 88 BPM | DIASTOLIC BLOOD PRESSURE: 86 MMHG | SYSTOLIC BLOOD PRESSURE: 140 MMHG | OXYGEN SATURATION: 98 % | WEIGHT: 171.2 LBS | BODY MASS INDEX: 32.32 KG/M2 | TEMPERATURE: 97.2 F | RESPIRATION RATE: 18 BRPM

## 2023-08-17 DIAGNOSIS — Z11.4 SCREENING FOR HIV (HUMAN IMMUNODEFICIENCY VIRUS): ICD-10-CM

## 2023-08-17 DIAGNOSIS — Z00.00 PHYSICAL EXAM: ICD-10-CM

## 2023-08-17 DIAGNOSIS — Z23 ENCOUNTER FOR IMMUNIZATION: Primary | ICD-10-CM

## 2023-08-17 DIAGNOSIS — G47.00 INSOMNIA, UNSPECIFIED TYPE: ICD-10-CM

## 2023-08-17 DIAGNOSIS — Z87.891 PERSONAL HISTORY OF TOBACCO USE, PRESENTING HAZARDS TO HEALTH: ICD-10-CM

## 2023-08-17 DIAGNOSIS — J32.9 RECURRENT SINUSITIS: ICD-10-CM

## 2023-08-17 DIAGNOSIS — E78.5 HYPERLIPIDEMIA, UNSPECIFIED HYPERLIPIDEMIA TYPE: ICD-10-CM

## 2023-08-17 DIAGNOSIS — Z12.31 ENCOUNTER FOR SCREENING MAMMOGRAM FOR BREAST CANCER: ICD-10-CM

## 2023-08-17 DIAGNOSIS — F41.9 ANXIETY: ICD-10-CM

## 2023-08-17 PROCEDURE — 90471 IMMUNIZATION ADMIN: CPT

## 2023-08-17 PROCEDURE — 99214 OFFICE O/P EST MOD 30 MIN: CPT | Performed by: FAMILY MEDICINE

## 2023-08-17 PROCEDURE — 96372 THER/PROPH/DIAG INJ SC/IM: CPT | Performed by: FAMILY MEDICINE

## 2023-08-17 PROCEDURE — 99396 PREV VISIT EST AGE 40-64: CPT | Performed by: FAMILY MEDICINE

## 2023-08-17 PROCEDURE — 90750 HZV VACC RECOMBINANT IM: CPT

## 2023-08-17 RX ORDER — NICOTINE 21 MG/24HR
1 PATCH, TRANSDERMAL 24 HOURS TRANSDERMAL EVERY 24 HOURS
Qty: 28 PATCH | Refills: 1 | Status: SHIPPED | OUTPATIENT
Start: 2023-08-17

## 2023-08-17 RX ORDER — TRAZODONE HYDROCHLORIDE 50 MG/1
50 TABLET ORAL
Qty: 90 TABLET | Refills: 0 | Status: SHIPPED | OUTPATIENT
Start: 2023-08-17

## 2023-08-17 RX ORDER — LORAZEPAM 1 MG/1
0.5 TABLET ORAL DAILY PRN
Qty: 30 TABLET | Refills: 0 | Status: SHIPPED | OUTPATIENT
Start: 2023-08-17

## 2023-08-17 RX ORDER — LORAZEPAM 1 MG/1
0.5 TABLET ORAL DAILY PRN
Qty: 90 TABLET | Refills: 0 | Status: CANCELLED | OUTPATIENT
Start: 2023-08-17

## 2023-08-17 RX ORDER — ROSUVASTATIN CALCIUM 20 MG/1
20 TABLET, COATED ORAL DAILY
Qty: 90 TABLET | Refills: 0 | Status: SHIPPED | OUTPATIENT
Start: 2023-08-17

## 2023-08-17 NOTE — PROGRESS NOTES
FAMILY PRACTICE OFFICE VISIT    NAME: Kael Nicole    AGE: 45 y.o. SEX: female  : 1970   MRN: [id number]    DATE: 2023  TIME: 2:58 PM    Assessment and Plan   1. Encounter for immunization  shingrix # 1 today and then # 2 in 2 mos at f/u visit. 2. Encounter for screening mammogram for breast cancer  When due      3. Screening for HIV (human immunodeficiency virus)      4. Anxiety  Stable with prn use of ativan  Controlled substance agreement signed today      5. Physical exam  Anticipatory guidance and preventative medicine discussed  Goes to gyn  Gets paps and mammograms  Colonoscopy 2022  Pt's BP elevated and has been trending up over time  Recommend checking at home  And bring cuff and readings to next visit. Could always consider testing for JORDAN    Goes to eye dr and dentist.        6. Recurrent sinusitis  Check CT sinuses  And then return visit      7. Hyperlipidemia, unspecified hyperlipidemia type  Suspect familial hyperlipidemia  Begin crestor and then f/u labs  If not improving - will need to consider cardio input for possible PCSKO (-)  Could also consider CAC score  Exercise  advise increase in whole grains - fresh fruits, veggies and whole grain cereals and breads; and less simple sugars, processed foods and white floured products, including decreasing intake if sweetened beverages; also encourage exercise for overall cardiovascular health        8. Personal history of tobacco use, presenting hazards to health  Baseline rx for CT scan  Pt has used patch in past with success  And it worked  Will issue rx  Smoking 1 ppd. Could consider pft's at f/u if needed. 9. Insomnia, unspecified type  rx for lunesta in past  Did not work as well  Requests trazodone 50 mg nightly prn        Patient Instructions   Restart flonase and use as needed.     Start crestor - 40 mg daily  And then in 4-6 weeks - get a blood test  (Nonfasting)    Return In 2 mos    CT sinuses   Schedule baseline CT chest - screen for lung cancer    To help lower the bp:  Exercise  Limit salt - no added salt  Limit caffeine  Avoid regular use of motrin like products  And avoid decongestants    Nicoderm patch    Shingrix # 1 today  #2 at followup in 2 mos    Rx also sent for trazodone      advise increase in whole grains - fresh fruits, veggies and whole grain cereals and breads; and less simple sugars, processed foods and white floured products, including decreasing intake if sweetened beverages; also encourage exercise for overall cardiovascular health                Chief Complaint     Chief Complaint   Patient presents with   • Physical Exam       History of Present Illness   Imer Barlow is a 45y.o.-year-old female who presents today for annual PE  Found to have LDL over 190  Her 15 yo dtr also has elevated lipids  And her mother as well. Has not taken meds in past for lipids. Was treated for sinusitis last week thru urgent care  And feels better  Was put on antibiotic and prednisone  No longer on either         Review of Systems   Review of Systems   Constitutional: Positive for fatigue and unexpected weight change. Negative for chills, diaphoresis and fever. Gained a couple of #  Does get fatiqued. Working and has a 2 yo grandson. HENT:        Had a recent sinus infection  Still feels some post-nasal dripping. Respiratory: Positive for wheezing. Negative for cough and shortness of breath. Occasional wheezing     Cardiovascular: Negative for chest pain and palpitations. Gastrointestinal: Negative for abdominal pain, blood in stool, constipation, diarrhea and rectal pain. Genitourinary: Negative for dysuria and hematuria. Post menopausal.     Neurological: Negative for dizziness, syncope and headaches. Psychiatric/Behavioral: Negative for dysphoric mood, self-injury and suicidal ideas. The patient is not nervous/anxious.          Taking ativan infrequently - once q 6 mos Active Problem List     Patient Active Problem List   Diagnosis   • Anxiety   • Hyperlipidemia   • Insomnia   • Lateral epicondylitis of right elbow   • Notalgia paresthetica   • Current every day smoker   • Fibroid   • Irregular bleeding   • Bilateral foot pain   • Overweight with body mass index (BMI) of 29 to 29.9 in adult   • Vaginal atrophy   • Menopause   • Chronic bilateral low back pain without sciatica         Past Medical History:  Past Medical History:   Diagnosis Date   • Abnormal uterine bleeding (AUB)     spotting between periods   • Anxiety    • Depression     history of depression   • Dysmenorrhea    • Fibroid 10/17/2019   • Insomnia    • Recurrent pregnancy loss, antepartum condition or complication    • Tubal pregnancy    • Uterine polyp    • Wears glasses        Past Surgical History:  Past Surgical History:   Procedure Laterality Date   • AUGMENTATION MAMMAPLASTY Bilateral    • BREAST SURGERY      biopsy; implants   •  SECTION     • EYE SURGERY Right     lazy eye correction    • HEMORROIDECTOMY     • [place] ARTHROSCOPY ELBOW SURGICAL DEBRIDEMENT EXTENSIVE Right 10/17/2016    Procedure: ARTHROSCOPY ELBOW WITH LATERAL EPICONDYLE DEBRIDMENT; ECRB RELEASE;  Surgeon: Na Richardson MD;  Location[facility];  Service: Orthopedics   • [place] HYSTEROSCOPY BX ENDOMETRIUM&/POLYPC W/WO D&C N/A 2016    Procedure: DILATATION AND CURETTAGE (D&C) WITH HYSTEROSCOPY POLYPECTOMY;  Surgeon: Harvinder Frank MD;  Location[facility];  Service: Gynecology   • WISDOM TOOTH EXTRACTION         Family History:  Family History   Problem Relation Age of Onset   • Hypertension Mother    • Rheum arthritis Father    • Hypertension Sister    • Lung cancer Maternal Grandmother 80   • Breast cancer Paternal Grandmother 27   • Cancer Maternal Aunt 39   • No Known Problems Daughter    • No Known Problems Daughter    • No Known Problems Maternal Grandfather    • No Known Problems Paternal Grandfather    • No Known Problems Maternal Aunt        Social History:  Social History     Socioeconomic History   • Marital status: /Civil Union     Spouse name: Not on file   • Number of children: Not on file   • Years of education: Not on file   • Highest education level: Not on file   Occupational History   • Not on file   Tobacco Use   • Smoking status: Every Day     Packs/day: 1.00     Years: 4.00     Total pack years: 4.00     Types: Cigarettes   • Smokeless tobacco: Never   • Tobacco comments:     quit for 20yrs but started smoking again 4yr ago   Vaping Use   • Vaping Use: Never used   Substance and Sexual Activity   • Alcohol use: Yes     Comment: socially   • Drug use: No   • Sexual activity: Yes     Birth control/protection: None, Male Sterilization   Other Topics Concern   • Not on file   Social History Narrative   • Not on file     Social Determinants of Health     Financial Resource Strain: Not on file   Food Insecurity: Not on file   Transportation Needs: Not on file   Physical Activity: Not on file   Stress: Not on file   Social Connections: Not on file   Intimate Partner Violence: Not on file   Housing Stability: Not on file       Objective     Vitals:    08/17/23 1457   BP:    Pulse: 88   Resp:    Temp:    SpO2:      Wt Readings from Last 3 Encounters:   08/17/23 77.7 kg (171 lb 3.2 oz)   08/09/23 76.7 kg (169 lb)   10/27/22 75.8 kg (167 lb 3.2 oz)       Physical Exam  Vitals and nursing note reviewed. Constitutional:       General: She is not in acute distress. Appearance: Normal appearance. She is not ill-appearing or toxic-appearing. HENT:      Right Ear: Tympanic membrane normal.      Left Ear: Tympanic membrane normal.      Nose: Nose normal. No congestion. Mouth/Throat:      Mouth: Mucous membranes are moist.      Pharynx: No oropharyngeal exudate or posterior oropharyngeal erythema. Eyes:      General: No scleral icterus. Extraocular Movements: Extraocular movements intact.       Pupils: Pupils are equal, round, and reactive to light. Neck:      Vascular: No carotid bruit. Cardiovascular:      Rate and Rhythm: Normal rate and regular rhythm. Pulses: Normal pulses. Heart sounds: Normal heart sounds. No murmur heard. Pulmonary:      Effort: Pulmonary effort is normal. No respiratory distress. Breath sounds: Normal breath sounds. No wheezing, rhonchi or rales. Abdominal:      General: There is no distension. Palpations: Abdomen is soft. There is no mass. Tenderness: There is no abdominal tenderness. There is no guarding or rebound. Musculoskeletal:         General: No tenderness. Cervical back: Neck supple. No tenderness. Right lower leg: No edema. Left lower leg: No edema. Lymphadenopathy:      Cervical: No cervical adenopathy. Skin:     General: Skin is warm. Coloration: Skin is not jaundiced or pale. Findings: No bruising, lesion or rash. Neurological:      General: No focal deficit present. Mental Status: She is alert and oriented to person, place, and time. Psychiatric:         Mood and Affect: Mood normal.         Behavior: Behavior normal.         Thought Content:  Thought content normal.         Judgment: Judgment normal.         Pertinent Laboratory/Diagnostic Studies:  Lab Results   Component Value Date    GLUCOSE 80 11/25/2015    BUN 17 06/22/2023    CREATININE 0.78 06/22/2023    CALCIUM 9.7 06/22/2023     11/25/2015    K 4.1 06/22/2023    CO2 29 06/22/2023     06/22/2023     Lab Results   Component Value Date    ALT 12 06/22/2023    AST 15 06/22/2023    ALKPHOS 57 06/22/2023    BILITOT 0.37 11/25/2015       Lab Results   Component Value Date    WBC 9.49 09/27/2016    HGB 15.1 09/27/2016    HCT 42.6 11/22/2016    MCV 97 09/27/2016     09/27/2016       Lab Results   Component Value Date    TSH 2.57 06/22/2023       Lab Results   Component Value Date    CHOL 231 11/25/2015     Lab Results   Component Value Date    TRIG 97 06/22/2023     Lab Results   Component Value Date    HDL 72 06/22/2023     Lab Results   Component Value Date    LDLCALC 193 (H) 06/22/2023     No results found for: "HGBA1C"    Results for orders placed or performed in visit on 06/22/23   Lipid panel   Result Value Ref Range    Total Cholesterol 287 (H) <200 mg/dL    HDL 72 > OR = 50 mg/dL    Triglycerides 97 <150 mg/dL    LDL Calculated 193 (H) mg/dL (calc)    Chol HDLC Ratio 4.0 <5.0 (calc)    Non-HDL Cholesterol 215 (H) <130 mg/dL (calc)   Comprehensive metabolic panel   Result Value Ref Range    Glucose, Random 85 65 - 99 mg/dL    BUN 17 7 - 25 mg/dL    Creatinine 0.78 0.50 - 1.03 mg/dL    eGFR 90 > OR = 60 mL/min/1.73m2    SL AMB BUN/CREATININE RATIO NOT APPLICABLE 6 - 22 (calc)    Sodium 142 135 - 146 mmol/L    Potassium 4.1 3.5 - 5.3 mmol/L    Chloride 107 98 - 110 mmol/L    CO2 29 20 - 32 mmol/L    Calcium 9.7 8.6 - 10.4 mg/dL    Protein, Total 6.8 6.1 - 8.1 g/dL    Albumin 4.3 3.6 - 5.1 g/dL    Globulin 2.5 1.9 - 3.7 g/dL (calc)    Albumin/Globulin Ratio 1.7 1.0 - 2.5 (calc)    TOTAL BILIRUBIN 0.5 0.2 - 1.2 mg/dL    Alkaline Phosphatase 57 37 - 153 U/L    AST 15 10 - 35 U/L    ALT 12 6 - 29 U/L   Hepatitis C Ab W/Refl To HCV RNA, Qn, PCR   Result Value Ref Range    HEP C AB NON-REACTIVE NON-REACTIVE   TSH, 3rd generation   Result Value Ref Range    TSH 2.57 mIU/L       Orders Placed This Encounter   Procedures   • CT sinus wo contrast   • CT lung screening program   • HIV 1/2 AG/AB w Reflex [facility] for 2 yr old and above   • Comprehensive metabolic panel   • CK       ALLERGIES:  Allergies   Allergen Reactions   • Amoxicillin Abdominal Pain   • Augmentin [Amoxicillin-Pot Clavulanate] GI Intolerance     md harris's use of cefazolin for 0R on 10/17/16.( Dr. Cecilia Gordon)   • Clavulanic Acid Abdominal Pain   • Phenobarbital Other (See Comments) and Delirium     hallucinations       Current Medications     Current Outpatient Medications   Medication Sig Dispense Refill   • LORazepam (ATIVAN) 1 mg tablet Take 0.5 tablets (0.5 mg total) by mouth daily as needed for anxiety 90 tablet 0   • montelukast (SINGULAIR) 10 mg tablet Take 10 mg by mouth every evening     • Multiple Vitamin (MULTI VITAMIN DAILY PO) Take 3 tablets by mouth daily gummies      • rosuvastatin (CRESTOR) 20 MG tablet Take 1 tablet (20 mg total) by mouth daily In the pm 90 tablet 0   • fluticasone (FLONASE) 50 mcg/act nasal spray        No current facility-administered medications for this visit.          Health Maintenance     Health Maintenance   Topic Date Due   • PT PLAN OF CARE  Never done   • HIV Screening  Never done   • COVID-19 Vaccine (4 - Pfizer series) 03/19/2022   • Breast Cancer Screening: Mammogram  06/07/2023   • BMI: Followup Plan  08/24/2023   • Influenza Vaccine (1) 09/01/2023   • Annual Physical  10/27/2023   • Osteoporosis Screening  08/24/2023 (Originally 10/2/2018)   • Pneumococcal Vaccine: Pediatrics (0 to 5 Years) and At-Risk Patients (6 to 59 Years) (2 - PCV) 08/17/2024 (Originally 9/8/2021)   • Depression Screening  08/17/2024   • BMI: Adult  08/17/2024   • Cervical Cancer Screening  10/27/2025   • Colorectal Cancer Screening  05/06/2027   • DTaP,Tdap,and Td Vaccines (2 - Td or Tdap) 05/25/2027   • Hepatitis C Screening  Completed   • HIB Vaccine  Aged Out   • IPV Vaccine  Aged Out   • Hepatitis A Vaccine  Aged Out   • Meningococcal ACWY Vaccine  Aged Out   • HPV Vaccine  Aged Out     Immunization History   Administered Date(s) Administered   • COVID-19 PFIZER VACCINE 0.3 ML IM 03/29/2021, 04/21/2021, 01/22/2022   • Influenza Quadrivalent Preservative Free 3 years and older IM 12/08/2014   • Influenza Quadrivalent, 6-35 Months IM 09/08/2016   • Influenza, recombinant, quadrivalent,injectable, preservative free 12/03/2019, 11/25/2020   • Influenza, seasonal, injectable 11/13/2007, 11/24/2008, 10/25/2013   • Influenza, seasonal, injectable, preservative free 10/02/2009   • Pneumococcal Polysaccharide PPV23 09/08/2020   • Tdap 05/25/2017          Leona Mchugh DO

## 2023-08-17 NOTE — PATIENT INSTRUCTIONS
Restart flonase and use as needed.     Start crestor - 40 mg daily  And then in 4-6 weeks - get a blood test  (Nonfasting)    Return In 2 mos    CT sinuses   Schedule baseline CT chest - screen for lung cancer    To help lower the bp:  Exercise  Limit salt - no added salt  Limit caffeine  Avoid regular use of motrin like products  And avoid decongestants    Nicoderm patch    Shingrix # 1 today  #2 at followup in 2 mos    Rx also sent for trazodone      advise increase in whole grains - fresh fruits, veggies and whole grain cereals and breads; and less simple sugars, processed foods and white floured products, including decreasing intake if sweetened beverages; also encourage exercise for overall cardiovascular health

## 2023-09-14 ENCOUNTER — TELEPHONE (OUTPATIENT)
Dept: FAMILY MEDICINE CLINIC | Facility: CLINIC | Age: 53
End: 2023-09-14

## 2023-09-14 NOTE — TELEPHONE ENCOUNTER
As per our conversation please see message below. I took a look at visit diagnoses and did not see a code as neoplasm of breast. Please advise. Thank you!

## 2023-09-14 NOTE — TELEPHONE ENCOUNTER
Data service: 8/17 for her physical, the insurance denied the shingles vaccine for the diagnosis code. Which was coded as "neoplasm of breast." We will need an updated script because that was the incorrect diagnosis code.

## 2023-10-06 ENCOUNTER — HOSPITAL ENCOUNTER (OUTPATIENT)
Dept: CT IMAGING | Facility: HOSPITAL | Age: 53
Discharge: HOME | End: 2023-10-06
Payer: COMMERCIAL

## 2023-10-06 DIAGNOSIS — J32.9 RECURRENT SINUSITIS: ICD-10-CM

## 2023-10-06 PROCEDURE — 70486 CT MAXILLOFACIAL W/O DYE: CPT

## 2023-10-06 PROCEDURE — G1004 CDSM NDSC: HCPCS

## 2023-10-17 ENCOUNTER — LAB (OUTPATIENT)
Dept: LAB | Facility: MEDICAL CENTER | Age: 53
End: 2023-10-17
Payer: COMMERCIAL

## 2023-10-17 DIAGNOSIS — E78.5 HYPERLIPIDEMIA, UNSPECIFIED HYPERLIPIDEMIA TYPE: ICD-10-CM

## 2023-10-17 DIAGNOSIS — Z11.4 SCREENING FOR HIV (HUMAN IMMUNODEFICIENCY VIRUS): ICD-10-CM

## 2023-10-17 LAB
ALBUMIN SERPL BCP-MCNC: 4.1 G/DL (ref 3.5–5)
ALP SERPL-CCNC: 53 U/L (ref 34–104)
ALT SERPL W P-5'-P-CCNC: 12 U/L (ref 7–52)
ANION GAP SERPL CALCULATED.3IONS-SCNC: 5 MMOL/L
AST SERPL W P-5'-P-CCNC: 17 U/L (ref 13–39)
BILIRUB SERPL-MCNC: 0.41 MG/DL (ref 0.2–1)
BUN SERPL-MCNC: 14 MG/DL (ref 5–25)
CALCIUM SERPL-MCNC: 9.6 MG/DL (ref 8.4–10.2)
CHLORIDE SERPL-SCNC: 109 MMOL/L (ref 96–108)
CK SERPL-CCNC: 85 U/L (ref 26–192)
CO2 SERPL-SCNC: 30 MMOL/L (ref 21–32)
CREAT SERPL-MCNC: 0.66 MG/DL (ref 0.6–1.3)
GFR SERPL CREATININE-BSD FRML MDRD: 99 ML/MIN/1.73SQ M
GLUCOSE P FAST SERPL-MCNC: 82 MG/DL (ref 65–99)
POTASSIUM SERPL-SCNC: 4.2 MMOL/L (ref 3.5–5.3)
PROT SERPL-MCNC: 6.8 G/DL (ref 6.4–8.4)
SODIUM SERPL-SCNC: 144 MMOL/L (ref 135–147)

## 2023-10-17 PROCEDURE — 87389 HIV-1 AG W/HIV-1&-2 AB AG IA: CPT

## 2023-10-17 PROCEDURE — 82550 ASSAY OF CK (CPK): CPT

## 2023-10-17 PROCEDURE — 80053 COMPREHEN METABOLIC PANEL: CPT

## 2023-10-17 PROCEDURE — 36415 COLL VENOUS BLD VENIPUNCTURE: CPT

## 2023-10-18 ENCOUNTER — OFFICE VISIT (OUTPATIENT)
Dept: FAMILY MEDICINE CLINIC | Facility: CLINIC | Age: 53
End: 2023-10-18
Payer: COMMERCIAL

## 2023-10-18 VITALS
OXYGEN SATURATION: 97 % | DIASTOLIC BLOOD PRESSURE: 86 MMHG | RESPIRATION RATE: 18 BRPM | SYSTOLIC BLOOD PRESSURE: 122 MMHG | TEMPERATURE: 97.5 F | HEIGHT: 61 IN | HEART RATE: 78 BPM | WEIGHT: 177 LBS | BODY MASS INDEX: 33.42 KG/M2

## 2023-10-18 DIAGNOSIS — F17.200 CURRENT EVERY DAY SMOKER: ICD-10-CM

## 2023-10-18 DIAGNOSIS — G47.00 INSOMNIA, UNSPECIFIED TYPE: ICD-10-CM

## 2023-10-18 DIAGNOSIS — E78.2 MIXED HYPERLIPIDEMIA: Primary | ICD-10-CM

## 2023-10-18 DIAGNOSIS — J32.9 RECURRENT SINUSITIS: ICD-10-CM

## 2023-10-18 DIAGNOSIS — Z23 ENCOUNTER FOR IMMUNIZATION: ICD-10-CM

## 2023-10-18 LAB
HIV 1+2 AB+HIV1 P24 AG SERPL QL IA: NORMAL
HIV 2 AB SERPL QL IA: NORMAL
HIV1 AB SERPL QL IA: NORMAL
HIV1 P24 AG SERPL QL IA: NORMAL

## 2023-10-18 PROCEDURE — 99214 OFFICE O/P EST MOD 30 MIN: CPT | Performed by: FAMILY MEDICINE

## 2023-10-18 PROCEDURE — 90471 IMMUNIZATION ADMIN: CPT | Performed by: FAMILY MEDICINE

## 2023-10-18 PROCEDURE — 90472 IMMUNIZATION ADMIN EACH ADD: CPT | Performed by: FAMILY MEDICINE

## 2023-10-18 PROCEDURE — 90750 HZV VACC RECOMBINANT IM: CPT | Performed by: FAMILY MEDICINE

## 2023-10-18 PROCEDURE — 90686 IIV4 VACC NO PRSV 0.5 ML IM: CPT | Performed by: FAMILY MEDICINE

## 2023-10-18 NOTE — PROGRESS NOTES
FAMILY PRACTICE OFFICE VISIT    NAME: Cal Irby    AGE: 52 y.o. SEX: female  : 1970   MRN: [id number]    DATE: 10/18/2023  TIME: 4:15 PM    Assessment and Plan   1. Encounter for immunization  Flu and shingrix # 2 today      2. Current every day smoker  Consider nicoderm  Encourage CT scan  As pt does have a cough - which she feels is due to post-nasal drip. BP improved from prior visit. Home readings controlled. 3. Mixed hyperlipidemia  Repeat In 2 mos      4. Recurrent sinusitis  Refer to allergy  Reviewed CT scan  No signficant findings. 5. Insomnia, unspecified type  Doing well on trazodone. Repeat labs after starting on statin - normal cmp and cpk. Patient Instructions   Shingrix # 2 today    Schedule CT chest for lung cancer screening and mammogram    Consider starting nicoderm    Fasting labs in 2 mos          Chief Complaint     Chief Complaint   Patient presents with    Follow-up     2m, started Crestor and did f/u blood work         History of Present Illness   Cal Irby is a 52y.o.-year-old female who presents today for shortterm f/u   At last visit - X 2 mos ago - a # of recommendations made  Pt went for CT sinuses  Constant PND and coughing from PND  Showed minimal left max sinus thickening  Does get about 3 sinus infections/year  Has not had allergy testing  Also here for repeat BP - as was elevated at prior visit      Review of Systems   Review of Systems   Constitutional:  Negative for chills, diaphoresis, fatigue and fever. Has been gaining some weight  Pt does not have time to exercise     HENT:          Chronic postnasal drip causing coughing  Using flonase nightly since last visit - does help a little. Respiratory:  Positive for cough and wheezing. Negative for shortness of breath.          Still smoking  But has nicoderm - ready to start taking      Mother has lung cancer    Cough from post-nasal drip    Occasional wheezing    Does not wake in the middle of the night SOB. Cardiovascular:  Negative for chest pain, palpitations and leg swelling. Pt did not bring log of bp readings  But was taking at home   Readings vary from day to day - most days - controlled - 110/80's    Pt is busy with her mother who has cancer  And she is the caregiver     Psychiatric/Behavioral:  Negative for self-injury and suicidal ideas. The patient is nervous/anxious. Taking trazodone for sleep and working  2 tabs is working great!   No am drowsiness     Caregiver for her mother who has cancer  Taking ativan q 2 weeks on avg         Active Problem List     Patient Active Problem List   Diagnosis    Anxiety    Hyperlipidemia    Insomnia    Lateral epicondylitis of right elbow    Notalgia paresthetica    Current every day smoker    Fibroid    Irregular bleeding    Bilateral foot pain    Overweight with body mass index (BMI) of 29 to 29.9 in adult    Vaginal atrophy    Menopause    Chronic bilateral low back pain without sciatica         Past Medical History:  Past Medical History:   Diagnosis Date    Abnormal uterine bleeding (AUB)     spotting between periods    Anxiety     Depression     history of depression    Dysmenorrhea     Fibroid 10/17/2019    Insomnia     Recurrent pregnancy loss, antepartum condition or complication     Tubal pregnancy     Uterine polyp     Wears glasses        Past Surgical History:  Past Surgical History:   Procedure Laterality Date    AUGMENTATION MAMMAPLASTY Bilateral 2009    BREAST SURGERY      biopsy; implants     SECTION      EYE SURGERY Right     lazy eye correction     HEMORROIDECTOMY      RI ARTHROSCOPY ELBOW SURGICAL DEBRIDEMENT EXTENSIVE Right 10/17/2016    Procedure: ARTHROSCOPY ELBOW WITH LATERAL EPICONDYLE DEBRIDMENT; ECRB RELEASE;  Surgeon: Carla Jackson MD;  Location: [facility];  Service: Orthopedics    RI HYSTEROSCOPY BX ENDOMETRIUM&/POLYPC W/WO D&C N/A 2016    Procedure: DILATATION AND CURETTAGE (D&C) WITH HYSTEROSCOPY POLYPECTOMY;  Surgeon: Juventino Villalba MD;  Location: [facility];  Service: Gynecology    WISDOM TOOTH EXTRACTION         Family History:  Family History   Problem Relation Age of Onset    Hypertension Mother     Rheum arthritis Father     Hypertension Sister     Lung cancer Maternal Grandmother 80    Breast cancer Paternal Grandmother 27    Cancer Maternal Aunt 39    No Known Problems Daughter     No Known Problems Daughter     No Known Problems Maternal Grandfather     No Known Problems Paternal Grandfather     No Known Problems Maternal Aunt        Social History:  Social History     Socioeconomic History    Marital status: /Civil Union     Spouse name: Not on file    Number of children: Not on file    Years of education: Not on file    Highest education level: Not on file   Occupational History    Not on file   Tobacco Use    Smoking status: Every Day     Packs/day: 1.00     Years: 4.00     Total pack years: 4.00     Types: Cigarettes    Smokeless tobacco: Never    Tobacco comments:     quit for 20yrs but started smoking again 4yr ago   Vaping Use    Vaping Use: Never used   Substance and Sexual Activity    Alcohol use: Yes     Comment: socially    Drug use: No    Sexual activity: Yes     Birth control/protection: None, Male Sterilization   Other Topics Concern    Not on file   Social History Narrative    Not on file     Social Determinants of Health     Financial Resource Strain: Not on file   Food Insecurity: Not on file   Transportation Needs: Not on file   Physical Activity: Not on file   Stress: Not on file   Social Connections: Not on file   Intimate Partner Violence: Not on file   Housing Stability: Not on file       Objective     Vitals:    10/18/23 1602   BP: 122/86   Pulse: 78   Resp: 18   Temp: 97.5 °F (36.4 °C)   SpO2: 97%     Wt Readings from Last 3 Encounters:   10/18/23 80.3 kg (177 lb)   08/17/23 77.7 kg (171 lb 3.2 oz)   08/09/23 76.7 kg (169 lb)       Physical Exam  Vitals and nursing note reviewed. Constitutional:       General: She is not in acute distress. Appearance: Normal appearance. She is not ill-appearing or toxic-appearing. Cardiovascular:      Rate and Rhythm: Normal rate and regular rhythm. Pulses: Normal pulses. Heart sounds: Normal heart sounds. No murmur heard. Pulmonary:      Effort: Pulmonary effort is normal. No respiratory distress. Breath sounds: Normal breath sounds. No wheezing, rhonchi or rales. Abdominal:      General: There is no distension. Palpations: Abdomen is soft. There is no mass. Tenderness: There is no abdominal tenderness. There is no guarding or rebound. Musculoskeletal:      Cervical back: Neck supple. No tenderness. Right lower leg: No edema. Left lower leg: No edema. Lymphadenopathy:      Cervical: No cervical adenopathy. Neurological:      General: No focal deficit present. Mental Status: She is alert and oriented to person, place, and time. Psychiatric:         Mood and Affect: Mood normal.         Behavior: Behavior normal.         Thought Content:  Thought content normal.         Judgment: Judgment normal.         Pertinent Laboratory/Diagnostic Studies:  Lab Results   Component Value Date    GLUCOSE 80 11/25/2015    BUN 14 10/17/2023    CREATININE 0.66 10/17/2023    CALCIUM 9.6 10/17/2023     11/25/2015    K 4.2 10/17/2023    CO2 30 10/17/2023     (H) 10/17/2023     Lab Results   Component Value Date    ALT 12 10/17/2023    AST 17 10/17/2023    ALKPHOS 53 10/17/2023    BILITOT 0.37 11/25/2015       Lab Results   Component Value Date    WBC 9.49 09/27/2016    HGB 15.1 09/27/2016    HCT 42.6 11/22/2016    MCV 97 09/27/2016     09/27/2016       Lab Results   Component Value Date    TSH 2.57 06/22/2023       Lab Results   Component Value Date    CHOL 231 11/25/2015     Lab Results   Component Value Date    TRIG 97 06/22/2023     Lab Results   Component Value Date    HDL 72 06/22/2023     Lab Results   Component Value Date    LDLCALC 193 (H) 06/22/2023     No results found for: "HGBA1C"    Results for orders placed or performed in visit on 10/17/23   Comprehensive metabolic panel   Result Value Ref Range    Sodium 144 135 - 147 mmol/L    Potassium 4.2 3.5 - 5.3 mmol/L    Chloride 109 (H) 96 - 108 mmol/L    CO2 30 21 - 32 mmol/L    ANION GAP 5 mmol/L    BUN 14 5 - 25 mg/dL    Creatinine 0.66 0.60 - 1.30 mg/dL    Glucose, Fasting 82 65 - 99 mg/dL    Calcium 9.6 8.4 - 10.2 mg/dL    AST 17 13 - 39 U/L    ALT 12 7 - 52 U/L    Alkaline Phosphatase 53 34 - 104 U/L    Total Protein 6.8 6.4 - 8.4 g/dL    Albumin 4.1 3.5 - 5.0 g/dL    Total Bilirubin 0.41 0.20 - 1.00 mg/dL    eGFR 99 ml/min/1.73sq m   CK   Result Value Ref Range    Total CK 85 26 - 192 U/L       No orders of the defined types were placed in this encounter.       ALLERGIES:  Allergies   Allergen Reactions    Amoxicillin Abdominal Pain    Augmentin [Amoxicillin-Pot Clavulanate] GI Intolerance     md harris's use of cefazolin for 0R on 10/17/16.( Dr. Shahla Jain)    Clavulanic Acid Abdominal Pain    Phenobarbital Other (See Comments) and Delirium     hallucinations       Current Medications     Current Outpatient Medications   Medication Sig Dispense Refill    fluticasone (FLONASE) 50 mcg/act nasal spray 1 spray into each nostril 2 (two) times a day prn 9.9 mL 3    LORazepam (ATIVAN) 1 mg tablet Take 0.5 tablets (0.5 mg total) by mouth daily as needed for anxiety Avoid driving/working while taking due to sedation 30 tablet 0    montelukast (SINGULAIR) 10 mg tablet Take 10 mg by mouth every evening      Multiple Vitamin (MULTI VITAMIN DAILY PO) Take 3 tablets by mouth daily gummies       nicotine (NICODERM CQ) 21 mg/24 hr TD 24 hr patch Place 1 patch on the skin over 24 hours every 24 hours For 6 weeks, then call for next dose 28 patch 1    rosuvastatin (CRESTOR) 20 MG tablet Take 1 tablet (20 mg total) by mouth daily In the pm 90 tablet 0    traZODone (DESYREL) 100 mg tablet Take 1 tablet (100 mg total) by mouth daily at bedtime Prn insomnia; allow at least 8 hours in bed after taking; do not take with ativan; dose increased as of 9/30/2023 90 tablet 0     No current facility-administered medications for this visit.          Health Maintenance     Health Maintenance   Topic Date Due    PT PLAN OF CARE  Never done    HIV Screening  Never done    Osteoporosis Screening  Never done    COVID-19 Vaccine (4 - Pfizer series) 03/19/2022    Breast Cancer Screening: Mammogram  06/07/2023    BMI: Followup Plan  08/24/2023    Influenza Vaccine (1) 09/01/2023    Pneumococcal Vaccine: Pediatrics (0 to 5 Years) and At-Risk Patients (6 to 59 Years) (2 - PCV) 08/17/2024 (Originally 9/8/2021)    Depression Screening  08/17/2024    Annual Physical  08/17/2024    BMI: Adult  10/18/2024    Cervical Cancer Screening  10/27/2025    Colorectal Cancer Screening  05/06/2027    DTaP,Tdap,and Td Vaccines (2 - Td or Tdap) 05/25/2027    Hepatitis C Screening  Completed    HIB Vaccine  Aged Out    IPV Vaccine  Aged Out    Hepatitis A Vaccine  Aged Out    Meningococcal ACWY Vaccine  Aged Out    HPV Vaccine  Aged Out     Immunization History   Administered Date(s) Administered    COVID-19 PFIZER VACCINE 0.3 ML IM 03/29/2021, 04/21/2021, 01/22/2022    Influenza Quadrivalent Preservative Free 3 years and older IM 12/08/2014    Influenza Quadrivalent, 6-35 Months IM 09/08/2016    Influenza, recombinant, quadrivalent,injectable, preservative free 12/03/2019, 11/25/2020    Influenza, seasonal, injectable 11/13/2007, 11/24/2008, 10/25/2013    Influenza, seasonal, injectable, preservative free 10/02/2009    Pneumococcal Polysaccharide PPV23 09/08/2020    Tdap 05/25/2017    Zoster Vaccine Recombinant 08/17/2023          Shira Ibrahim DO

## 2023-10-18 NOTE — PATIENT INSTRUCTIONS
Shingrix # 2 today    Schedule CT chest for lung cancer screening and mammogram    Consider starting nicoderm    Fasting labs in 2 mos

## 2023-10-24 DIAGNOSIS — G47.00 INSOMNIA, UNSPECIFIED TYPE: ICD-10-CM

## 2023-10-24 DIAGNOSIS — J30.9 ALLERGIC RHINITIS, UNSPECIFIED SEASONALITY, UNSPECIFIED TRIGGER: ICD-10-CM

## 2023-10-24 RX ORDER — FLUTICASONE PROPIONATE 50 MCG
SPRAY, SUSPENSION (ML) NASAL
Qty: 48 ML | Refills: 2 | Status: SHIPPED | OUTPATIENT
Start: 2023-10-24

## 2023-11-02 ENCOUNTER — ANNUAL EXAM (OUTPATIENT)
Dept: GYNECOLOGY | Facility: CLINIC | Age: 53
End: 2023-11-02

## 2023-11-02 VITALS
DIASTOLIC BLOOD PRESSURE: 82 MMHG | WEIGHT: 176 LBS | BODY MASS INDEX: 33.23 KG/M2 | SYSTOLIC BLOOD PRESSURE: 126 MMHG | HEIGHT: 61 IN

## 2023-11-02 DIAGNOSIS — D25.9 UTERINE LEIOMYOMA, UNSPECIFIED LOCATION: ICD-10-CM

## 2023-11-02 DIAGNOSIS — N95.2 VAGINAL ATROPHY: ICD-10-CM

## 2023-11-02 DIAGNOSIS — Z12.31 VISIT FOR SCREENING MAMMOGRAM: ICD-10-CM

## 2023-11-02 DIAGNOSIS — Z01.419 WOMEN'S ANNUAL ROUTINE GYNECOLOGICAL EXAMINATION: Primary | ICD-10-CM

## 2023-11-02 NOTE — PROGRESS NOTES
Assessment/Plan   Diagnoses and all orders for this visit:    Women's annual routine gynecological examination    Visit for screening mammogram  -     Mammo screening bilateral w 3d & cad; Future    Vaginal atrophy    Uterine leiomyoma, unspecified location    1. yearly exam-Pap smear deferred, self breast awareness reviewed, calcium/vitamin D recommendations discussed, mammogram request given, colonoscopy up-to-date follow-up as per specialist.  2. history uterine myoma-5.2 cm myoma was noted on most recent imaging 2017. Uterus was previously 8 weeks size at that time, now top normal to 6 weeks size. She denies any symptoms. To call return with any such findings. 3. menopausal-last menses 2019. She does note some night sweats and hot flushes but not severe. Practical recommendations being done. She will call or return with any serious symptoms. 4. mild vaginal atrophy-minimal changes noted on exam.  Patient denies any dyspareunia. Vaginal lubrication/moisturizer sheet given, to use accordingly. 5. Contraception-vasectomy  6. history of irregular bleeding-resolved after D&C  with polypectomy. 7.  Other-daughter delivered her baby through our practice, Danay Hayes is 3years old. Unfortunately, FOB  from drug overdose. Younger daughter has Nexplanon and birth control pill, being followed by me. She was appreciative of the care of her family. Follow-up 1 year for yearly exam or as needed. Subjective   Patient ID: Hafsa Win is a 52 y.o. female.     Vitals:    23 1624   BP: 126/82     HPI    The following portions of the patient's history were reviewed and updated as appropriate: allergies, current medications, past family history, past medical history, past social history, past surgical history, and problem list.  Past Medical History:   Diagnosis Date    Abnormal uterine bleeding (AUB)     spotting between periods    Anxiety     Depression     history of depression Dysmenorrhea     Fibroid 10/17/2019    Insomnia     Recurrent pregnancy loss, antepartum condition or complication     Tubal pregnancy     Uterine polyp     Wears glasses      Past Surgical History:   Procedure Laterality Date    AUGMENTATION MAMMAPLASTY Bilateral 2009    BREAST SURGERY      biopsy; implants     SECTION      EYE SURGERY Right     lazy eye correction     HEMORROIDECTOMY      [place] ARTHROSCOPY ELBOW SURGICAL DEBRIDEMENT EXTENSIVE Right 10/17/2016    Procedure: ARTHROSCOPY ELBOW WITH LATERAL EPICONDYLE DEBRIDMENT; ECRB RELEASE;  Surgeon: Carla Jackson MD;  Location: [facility];  Service: Orthopedics    WA HYSTEROSCOPY BX ENDOMETRIUM&/POLYPC W/WO D&C N/A 2016    Procedure: DILATATION AND CURETTAGE (D&C) WITH HYSTEROSCOPY POLYPECTOMY;  Surgeon: Anne Cartwright MD;  Location: [facility];  Service: Gynecology    WISDOM TOOTH EXTRACTION       OB History    Para Term  AB Living   4 2 2   2 2   SAB IAB Ectopic Multiple Live Births           2      # Outcome Date GA Lbr Chris/2nd Weight Sex Delivery Anes PTL Lv   4 AB            3 AB            2 Term            1 Term                Current Outpatient Medications:     fluticasone (FLONASE) 50 mcg/act nasal spray, SPRAY 1 SPRAY INTO EACH NOSTRIL TWO TIMES A DAY AS NEEDED, Disp: 48 mL, Rfl: 2    LORazepam (ATIVAN) 1 mg tablet, Take 0.5 tablets (0.5 mg total) by mouth daily as needed for anxiety Avoid driving/working while taking due to sedation, Disp: 30 tablet, Rfl: 0    Multiple Vitamin (MULTI VITAMIN DAILY PO), Take 3 tablets by mouth daily gummies , Disp: , Rfl:     nicotine (NICODERM CQ) 21 mg/24 hr TD 24 hr patch, Place 1 patch on the skin over 24 hours every 24 hours For 6 weeks, then call for next dose, Disp: 28 patch, Rfl: 1    rosuvastatin (CRESTOR) 20 MG tablet, Take 1 tablet (20 mg total) by mouth daily In the pm, Disp: 90 tablet, Rfl: 0    traZODone (DESYREL) 100 mg tablet, Take 1 tablet (100 mg total) by mouth daily at bedtime Prn insomnia; allow at least 8 hours in bed after taking; do not take with ativan; dose increased as of 9/30/2023, Disp: 90 tablet, Rfl: 0  Allergies   Allergen Reactions    Amoxicillin Abdominal Pain    Augmentin [Amoxicillin-Pot Clavulanate] GI Intolerance     md harris's use of cefazolin for 0R on 10/17/16.( Dr. Kinsey Singh)    Clavulanic Acid Abdominal Pain    Phenobarbital Other (See Comments) and Delirium     hallucinations     Social History     Socioeconomic History    Marital status: /Civil Union     Spouse name: None    Number of children: None    Years of education: None    Highest education level: None   Occupational History    None   Tobacco Use    Smoking status: Every Day     Packs/day: 1.00     Years: 4.00     Total pack years: 4.00     Types: Cigarettes    Smokeless tobacco: Never    Tobacco comments:     quit for 20yrs but started smoking again 4yr ago   Vaping Use    Vaping Use: Never used   Substance and Sexual Activity    Alcohol use: Yes     Comment: socially    Drug use: No    Sexual activity: Yes     Birth control/protection: None, Male Sterilization   Other Topics Concern    None   Social History Narrative    None     Social Determinants of Health     Financial Resource Strain: Not on file   Food Insecurity: Not on file   Transportation Needs: Not on file   Physical Activity: Not on file   Stress: Not on file   Social Connections: Not on file   Intimate Partner Violence: Not on file   Housing Stability: Not on file     Family History   Problem Relation Age of Onset    Hypertension Mother     Rheum arthritis Father     Hypertension Sister     Lung cancer Maternal Grandmother 80    Breast cancer Paternal Grandmother 27    Cancer Maternal Aunt 45    No Known Problems Daughter     No Known Problems Daughter     No Known Problems Maternal Grandfather     No Known Problems Paternal Grandfather     No Known Problems Maternal Aunt        Review of Systems    Objective   Physical Exam  OBGyn Exam     Objective      /82   Ht 5' 1" (1.549 m)   Wt 79.8 kg (176 lb)   LMP  (LMP Unknown)   BMI 33.25 kg/m²     General:   alert and oriented, in no acute distress   Neck: normal to inspection and palpation   Breast: normal appearance, no masses or tenderness   Heart:    Lungs:    Abdomen: soft, non-tender, without masses or organomegaly   Vulva: normal   Vagina: Minimally atrophic, without erythema or lesions or discharge. Cervix: Minimally atrophic, without lesions or discharge or cervicitis. No CMT. Uterus: mid-position, non-tender, size consistent with top normal to 6 weeks weeks   Adnexa: no mass, fullness, tenderness   Rectum: Deferred, patient declined.   External hemorrhoid visualized    Psych:  Normal mood and affect   Skin:  Without obvious lesions   Eyes: symmetric, with normal movements and reactivity   Musculoskeletal:  Normal muscle tone and movements appreciated

## 2023-11-08 DIAGNOSIS — E78.5 HYPERLIPIDEMIA, UNSPECIFIED HYPERLIPIDEMIA TYPE: ICD-10-CM

## 2023-11-08 RX ORDER — ROSUVASTATIN CALCIUM 20 MG/1
20 TABLET, COATED ORAL DAILY
Qty: 90 TABLET | Refills: 0 | Status: SHIPPED | OUTPATIENT
Start: 2023-11-08

## 2023-11-13 ENCOUNTER — HOSPITAL ENCOUNTER (OUTPATIENT)
Dept: MAMMOGRAPHY | Facility: MEDICAL CENTER | Age: 53
Discharge: HOME | End: 2023-11-13
Payer: COMMERCIAL

## 2023-11-13 VITALS — WEIGHT: 175.93 LBS | HEIGHT: 61 IN | BODY MASS INDEX: 33.22 KG/M2

## 2023-11-13 DIAGNOSIS — Z12.31 ENCOUNTER FOR SCREENING MAMMOGRAM FOR BREAST CANCER: ICD-10-CM

## 2023-11-13 PROCEDURE — 77063 BREAST TOMOSYNTHESIS BI: CPT

## 2023-11-13 PROCEDURE — 77067 SCR MAMMO BI INCL CAD: CPT

## 2023-12-18 DIAGNOSIS — G47.00 INSOMNIA, UNSPECIFIED TYPE: ICD-10-CM

## 2023-12-18 RX ORDER — TRAZODONE HYDROCHLORIDE 100 MG/1
TABLET ORAL
Qty: 90 TABLET | Refills: 0 | Status: SHIPPED | OUTPATIENT
Start: 2023-12-18

## 2024-02-05 DIAGNOSIS — E78.5 HYPERLIPIDEMIA, UNSPECIFIED HYPERLIPIDEMIA TYPE: ICD-10-CM

## 2024-02-05 RX ORDER — ROSUVASTATIN CALCIUM 20 MG/1
TABLET, COATED ORAL
Qty: 90 TABLET | Refills: 0 | Status: SHIPPED | OUTPATIENT
Start: 2024-02-05

## 2024-03-17 DIAGNOSIS — G47.00 INSOMNIA, UNSPECIFIED TYPE: ICD-10-CM

## 2024-03-18 RX ORDER — TRAZODONE HYDROCHLORIDE 100 MG/1
TABLET ORAL
Qty: 90 TABLET | Refills: 1 | Status: SHIPPED | OUTPATIENT
Start: 2024-03-18

## 2024-03-26 ENCOUNTER — PATIENT OUTREACH (OUTPATIENT)
Dept: BARIATRICS | Facility: CLINIC | Age: 54
End: 2024-03-26

## 2024-03-26 NOTE — PROGRESS NOTES
Weight History     Highest Weight: 220#  Lowest Weight: 125#    Past Attempts at Weight Loss: Exercise, Commerical Programs (Weight Watchers, Damari Vidal, [employer] etc), and Low Carb/High Protein Diet (Atkins, [place]), [place], Diet    Food Recall  Breakfast: whole wheat english muffin, egg  Snack: skip  Lunch:  brown rice, vegetable, protein   Snack: skip  Dinner: chicken, pork, or lean meat + salad or vegetable + starch (pasta)   Snack: cereal OR piece of cake   ( usually cooks)    Beverages: 20-24oz water, 2 cups coffee with cream and sugar  Volume of Beverage Intake: varies, 20-24oz water    Frequency of Dining out: rarely     Would the patient benefit from visit with  specialist?: emotional eating (discussed option of  visit)    Physical Activity Intake  Activity: none currently  Frequency of Exercise: none currently  Physical Limitations to Exercise: surgery on elbow 6-7 years ago, decreased exercise since then, discomfort     Review of Programs  Overview of medical weight management programs provided?: Yes   Is patient interested in discussing medication?: Yes (mainly interested in discussing medications during visit)  Is patient interested in discussing bariatric surgery?: No  Does patient know which pathway they are interested in?: Yes (interest in medications specifically Zepbound)

## 2024-03-28 ENCOUNTER — TELEPHONE (OUTPATIENT)
Age: 54
End: 2024-03-28

## 2024-03-28 ENCOUNTER — OFFICE VISIT (OUTPATIENT)
Dept: BARIATRICS | Facility: CLINIC | Age: 54
End: 2024-03-28
Payer: COMMERCIAL

## 2024-03-28 VITALS
BODY MASS INDEX: 33.91 KG/M2 | HEART RATE: 82 BPM | TEMPERATURE: 97.2 F | HEIGHT: 61 IN | SYSTOLIC BLOOD PRESSURE: 124 MMHG | WEIGHT: 179.6 LBS | RESPIRATION RATE: 16 BRPM | DIASTOLIC BLOOD PRESSURE: 70 MMHG

## 2024-03-28 DIAGNOSIS — E78.2 MIXED HYPERLIPIDEMIA: ICD-10-CM

## 2024-03-28 DIAGNOSIS — E66.9 OBESITY, CLASS I, BMI 30-34.9: Primary | ICD-10-CM

## 2024-03-28 DIAGNOSIS — E66.09 CLASS 1 OBESITY DUE TO EXCESS CALORIES WITHOUT SERIOUS COMORBIDITY WITH BODY MASS INDEX (BMI) OF 33.0 TO 33.9 IN ADULT: ICD-10-CM

## 2024-03-28 PROBLEM — E66.811 CLASS 1 OBESITY DUE TO EXCESS CALORIES WITHOUT SERIOUS COMORBIDITY WITH BODY MASS INDEX (BMI) OF 33.0 TO 33.9 IN ADULT: Status: ACTIVE | Noted: 2020-09-08

## 2024-03-28 PROCEDURE — 99204 OFFICE O/P NEW MOD 45 MIN: CPT | Performed by: PHYSICIAN ASSISTANT

## 2024-03-28 RX ORDER — TIRZEPATIDE 2.5 MG/.5ML
2.5 INJECTION, SOLUTION SUBCUTANEOUS WEEKLY
Qty: 2 ML | Refills: 0 | Status: SHIPPED | OUTPATIENT
Start: 2024-03-28 | End: 2024-04-25

## 2024-03-28 NOTE — PROGRESS NOTES
Assessment/Plan:    Class 1 obesity due to excess calories without serious comorbidity with body mass index (BMI) of 33.0 to 33.9 in adult  -Discussed options of HealthyCORE-Intensive Lifestyle Intervention Program, Very Low Calorie Diet-VLCD, and Conservative Program and the role of weight loss medications.Explained the importance of making lifestyle changes if utilizing medication to aid in weight loss  -Initial weight loss goal of 5-10% weight loss for improved health  -Screening labs and records reviewed from 10/17/23 with CMP  - STOP BANG-1/8    -Patient is interested in pursuing Conservative Program    Goals:  -recommend to start food logging again  - To drink at least 64oz of water daily.No sugary beverages.  -discussed trying to increase exercise    To start on zepbound. They have tried more than 6 months of lifestyle modifications including diet and activity changes and has had insignificant weight loss of less than 1 lb a week. Patient denies personal and family history of MCT and MEN2 tumors. Patient denies personal history of pancreatitis. Side effects discussed but not limited to diarrhea, bloating, constipation, GI upset, heartburn, increased heart rate, headache, low blood sugar, fatigue and dizziness. Titration and medication administration discussed.  Medication agreement signed      Initial Weight:179.6  Goal Weight:140       Hyperlipidemia  On crestor  -should improve with weight loss, dietary, and lifestyle changes        Follow up in approximately  6 week nurse visit and 4 months  with Non-Surgical Physician/Advanced Practitioner.        Diagnoses and all orders for this visit:    Obesity, Class I, BMI 30-34.9  -     tirzepatide (Zepbound) 2.5 mg/0.5 mL auto-injector; Inject 0.5 mL (2.5 mg total) under the skin once a week for 28 days    Mixed hyperlipidemia  -     tirzepatide (Zepbound) 2.5 mg/0.5 mL auto-injector; Inject 0.5 mL (2.5 mg total) under the skin once a week for 28 days    Class 1  obesity due to excess calories without serious comorbidity with body mass index (BMI) of 33.0 to 33.9 in adult          Subjective:   Chief Complaint   Patient presents with    Consult     MWM- Consult, GW 140lb- Stop Bang 1/8       Patient ID: Lizy Peterson  is a 53 y.o. female with excess weight/obesity here to pursue weight management.    Past Medical History:   Diagnosis Date    Abnormal uterine bleeding (AUB)     spotting between periods    Anxiety     Depression     history of depression    Dysmenorrhea     Fibroid 10/17/2019    Insomnia     Recurrent pregnancy loss, antepartum condition or complication     Tubal pregnancy     Uterine polyp     Wears glasses        HPI: Here for MWM consult    She has struggled with weight through adulthood.  Food journaling has helped her prior.  She has made a lot of dietary changes in the past but having problems losing    Problem is usually with nighttime eating.     She may be interested in zepbound. Her twin takes it with good results    Obesity/Excess Weight:  Severity:  class I with HLD  Highest Weight: 220#  Lowest Weight: 125#  Onset:  years    Modifiers: Diet and Exercise and Commercial Weight Loss Programs-ie. Weight Watchers, P[employer] Systems, Nutrisystem, etc. [facility], [place], [place]  Contributing factors: Insufficient Physical Activity and Stress/Emotional Eating    Hydration:20-24oz water, 2 cups coffee with cream and sugar (2 spoonfuls)  Alcohol: none  Exercise:nothing formal  Occupation:sedentary, [employer]  Dining out/takeout:rare    Food Recall  Breakfast: whole wheat english muffin, egg and sometimes turkey  Snack: skip  Lunch:  brown rice, vegetable, protein   Snack: skip  Dinner: chicken, pork, or lean meat + salad or vegetable + starch (pasta)   Snack: cereal OR piece of cake   ( usually cooks)      The following portions of the patient's history were reviewed and updated as appropriate: She  has a past medical history of Abnormal uterine bleeding (AUB),  Anxiety, Depression, Dysmenorrhea, Fibroid (10/17/2019), Insomnia, Recurrent pregnancy loss, antepartum condition or complication, Tubal pregnancy, Uterine polyp, and Wears glasses.  She   Patient Active Problem List    Diagnosis Date Noted    Chronic bilateral low back pain without sciatica 2020    Vaginal atrophy 10/22/2020    Menopause 10/22/2020    Class 1 obesity due to excess calories without serious comorbidity with body mass index (BMI) of 33.0 to 33.9 in adult 2020    Bilateral foot pain 2019    Uterine fibroid 10/17/2019    Irregular bleeding 10/17/2019    Notalgia paresthetica 07/10/2018    Current every day smoker 07/10/2018    Hyperlipidemia 2017    Lateral epicondylitis of right elbow 08/10/2015    Anxiety 10/25/2013    Insomnia 10/02/2012     She  has a past surgical history that includes Breast surgery; Eye surgery (Right);  section; Hemorroidectomy; [place] tooth extraction; pr hysteroscopy bx endometrium&/polypc w/wo d&c (N/A, 2016); pr arthroscopy elbow surgical debridement extensive (Right, 10/17/2016); Augmentation mammaplasty (Bilateral, ); and Oophorectomy (Left).  Her family history includes Breast cancer (age of onset: 30) in her paternal grandmother; Cancer (age of onset: 45) in her maternal aunt; Hypertension in her mother and sister; Lung cancer (age of onset: 80) in her maternal grandmother; No Known Problems in her daughter, daughter, maternal aunt, maternal grandfather, and paternal grandfather; Rheum arthritis in her father.  She  reports that she has been smoking cigarettes. She has a 4 pack-year smoking history. She has never used smokeless tobacco. She reports current alcohol use. She reports that she does not use drugs.  Current Outpatient Medications   Medication Sig Dispense Refill    fluticasone (FLONASE) 50 mcg/act nasal spray SPRAY 1 SPRAY INTO EACH NOSTRIL TWO TIMES A DAY AS NEEDED (Patient taking differently: as needed) 48 mL 2     LORazepam (ATIVAN) 1 mg tablet Take 0.5 tablets (0.5 mg total) by mouth daily as needed for anxiety Avoid driving/working while taking due to sedation (Patient taking differently: Take 0.5 mg by mouth as needed for anxiety Avoid driving/working while taking due to sedation) 30 tablet 0    rosuvastatin (CRESTOR) 20 MG tablet TAKE 1 TABLET (20 MG TOTAL) BY MOUTH DAILY IN THE EVENING 90 tablet 0    tirzepatide (Zepbound) 2.5 mg/0.5 mL auto-injector Inject 0.5 mL (2.5 mg total) under the skin once a week for 28 days 2 mL 0    traZODone (DESYREL) 100 mg tablet TAKE 1 TAB BY MOUTH DAILY AT BEDTIME AS NEEDED INSOMNIA ALLOW AT LEAST 8 HOURS IN BED AFTER TAKING DO NOT TAKE WITH ATIVAN 90 tablet 1    Multiple Vitamin (MULTI VITAMIN DAILY PO) Take 3 tablets by mouth daily gummies  (Patient not taking: Reported on 3/28/2024)      nicotine (NICODERM CQ) 21 mg/24 hr TD 24 hr patch Place 1 patch on the skin over 24 hours every 24 hours For 6 weeks, then call for next dose (Patient not taking: Reported on 3/28/2024) 28 patch 1     No current facility-administered medications for this visit.     Current Outpatient Medications on File Prior to Visit   Medication Sig    fluticasone (FLONASE) 50 mcg/act nasal spray SPRAY 1 SPRAY INTO EACH NOSTRIL TWO TIMES A DAY AS NEEDED (Patient taking differently: as needed)    LORazepam (ATIVAN) 1 mg tablet Take 0.5 tablets (0.5 mg total) by mouth daily as needed for anxiety Avoid driving/working while taking due to sedation (Patient taking differently: Take 0.5 mg by mouth as needed for anxiety Avoid driving/working while taking due to sedation)    rosuvastatin (CRESTOR) 20 MG tablet TAKE 1 TABLET (20 MG TOTAL) BY MOUTH DAILY IN THE EVENING    traZODone (DESYREL) 100 mg tablet TAKE 1 TAB BY MOUTH DAILY AT BEDTIME AS NEEDED INSOMNIA ALLOW AT LEAST 8 HOURS IN BED AFTER TAKING DO NOT TAKE WITH ATIVAN    Multiple Vitamin (MULTI VITAMIN DAILY PO) Take 3 tablets by mouth daily gummies  (Patient not  "taking: Reported on 3/28/2024)    nicotine (NICODERM CQ) 21 mg/24 hr TD 24 hr patch Place 1 patch on the skin over 24 hours every 24 hours For 6 weeks, then call for next dose (Patient not taking: Reported on 3/28/2024)     No current facility-administered medications on file prior to visit.     She is allergic to amoxicillin, augmentin [amoxicillin-pot clavulanate], clavulanic acid, and phenobarbital..    Review of Systems   Constitutional:  Negative for fever.   Respiratory:  Negative for shortness of breath.    Cardiovascular:  Negative for chest pain and palpitations.   Gastrointestinal:  Negative for abdominal pain, constipation, diarrhea and vomiting.   Genitourinary:  Negative for difficulty urinating.   Skin:  Negative for rash.   Neurological:  Negative for headaches.   Psychiatric/Behavioral:  Negative for dysphoric mood. The patient is not nervous/anxious.        Objective:    /70   Pulse 82   Temp (!) 97.2 °F (36.2 °C)   Resp 16   Ht 5' 1.25\" (1.556 m)   Wt 81.5 kg (179 lb 9.6 oz)   LMP  (LMP Unknown)   BMI 33.66 kg/m²     Physical Exam  Vitals and nursing note reviewed.   Constitutional:       General: She is not in acute distress.     Appearance: She is well-developed. She is obese.   HENT:      Head: Normocephalic and atraumatic.   Eyes:      Conjunctiva/sclera: Conjunctivae normal.   Neck:      Thyroid: No thyromegaly.   Pulmonary:      Effort: Pulmonary effort is normal. No respiratory distress.   Skin:     Findings: No rash (visible).   Neurological:      Mental Status: She is alert and oriented to person, place, and time.   Psychiatric:         Mood and Affect: Mood normal.         Behavior: Behavior normal.         "

## 2024-03-28 NOTE — TELEPHONE ENCOUNTER
PA for Zepbound    Submitted via    []CMM-KEY    [x]NikkiPingMe-Case ID # [id number]   []Faxed to plan   []Other website    []Phone call Case ID #      Office notes sent, clinical questions answered. Awaiting determination    Turnaround time for your insurance to make a decision on your Prior Authorization can take 7-21 business days.

## 2024-03-28 NOTE — ASSESSMENT & PLAN NOTE
-Discussed options of HealthyCORE-Intensive Lifestyle Intervention Program, Very Low Calorie Diet-VLCD, and Conservative Program and the role of weight loss medications.Explained the importance of making lifestyle changes if utilizing medication to aid in weight loss  -Initial weight loss goal of 5-10% weight loss for improved health  -Screening labs and records reviewed from 10/17/23 with CMP  - STOP BANG-1/8    -Patient is interested in pursuing Conservative Program    Goals:  -recommend to start food logging again  - To drink at least 64oz of water daily.No sugary beverages.  -discussed trying to increase exercise    To start on zepbound. They have tried more than 6 months of lifestyle modifications including diet and activity changes and has had insignificant weight loss of less than 1 lb a week. Patient denies personal and family history of MCT and MEN2 tumors. Patient denies personal history of pancreatitis. Side effects discussed but not limited to diarrhea, bloating, constipation, GI upset, heartburn, increased heart rate, headache, low blood sugar, fatigue and dizziness. Titration and medication administration discussed.  Medication agreement signed      Initial Weight:179.6  Goal Weight:140

## 2024-03-29 NOTE — TELEPHONE ENCOUNTER
PA for Zepbound Approved   Date(s) approved February 27, 2024 to November 23, 2024   Case #     Patient advised by [x] [email or web address] Message                      [] Phone call       Pharmacy advised by [x]Fax                                     []Phone call    Approval letter scanned into Media Yes

## 2024-04-09 DIAGNOSIS — E66.09 CLASS 1 OBESITY DUE TO EXCESS CALORIES WITHOUT SERIOUS COMORBIDITY WITH BODY MASS INDEX (BMI) OF 33.0 TO 33.9 IN ADULT: Primary | ICD-10-CM

## 2024-04-09 RX ORDER — TIRZEPATIDE 5 MG/.5ML
5 INJECTION, SOLUTION SUBCUTANEOUS WEEKLY
Qty: 2 ML | Refills: 1 | Status: SHIPPED | OUTPATIENT
Start: 2024-04-09 | End: 2024-06-04

## 2024-04-16 ENCOUNTER — LAB (OUTPATIENT)
Dept: LAB | Facility: CLINIC | Age: 54
End: 2024-04-16
Payer: COMMERCIAL

## 2024-04-16 DIAGNOSIS — E78.2 MIXED HYPERLIPIDEMIA: ICD-10-CM

## 2024-04-16 LAB
ALBUMIN SERPL BCP-MCNC: 4.2 G/DL (ref 3.5–5)
ALP SERPL-CCNC: 48 U/L (ref 34–104)
ALT SERPL W P-5'-P-CCNC: 11 U/L (ref 7–52)
ANION GAP SERPL CALCULATED.3IONS-SCNC: 9 MMOL/L (ref 4–13)
AST SERPL W P-5'-P-CCNC: 17 U/L (ref 13–39)
BILIRUB SERPL-MCNC: 0.35 MG/DL (ref 0.2–1)
BUN SERPL-MCNC: 16 MG/DL (ref 5–25)
CALCIUM SERPL-MCNC: 9.3 MG/DL (ref 8.4–10.2)
CHLORIDE SERPL-SCNC: 106 MMOL/L (ref 96–108)
CHOLEST SERPL-MCNC: 145 MG/DL
CO2 SERPL-SCNC: 26 MMOL/L (ref 21–32)
CREAT SERPL-MCNC: 0.77 MG/DL (ref 0.6–1.3)
GFR SERPL CREATININE-BSD FRML MDRD: 87 ML/MIN/1.73SQ M
GLUCOSE P FAST SERPL-MCNC: 86 MG/DL (ref 65–99)
HDLC SERPL-MCNC: 55 MG/DL
LDLC SERPL CALC-MCNC: 75 MG/DL (ref 0–100)
NONHDLC SERPL-MCNC: 90 MG/DL
POTASSIUM SERPL-SCNC: 3.9 MMOL/L (ref 3.5–5.3)
PROT SERPL-MCNC: 6.8 G/DL (ref 6.4–8.4)
SODIUM SERPL-SCNC: 141 MMOL/L (ref 135–147)
TRIGL SERPL-MCNC: 77 MG/DL

## 2024-04-16 PROCEDURE — 36415 COLL VENOUS BLD VENIPUNCTURE: CPT

## 2024-04-16 PROCEDURE — 80061 LIPID PANEL: CPT

## 2024-04-16 PROCEDURE — 80053 COMPREHEN METABOLIC PANEL: CPT

## 2024-04-23 ENCOUNTER — TELEPHONE (OUTPATIENT)
Dept: BARIATRICS | Facility: CLINIC | Age: 54
End: 2024-04-23

## 2024-04-23 DIAGNOSIS — E66.09 CLASS 1 OBESITY DUE TO EXCESS CALORIES WITHOUT SERIOUS COMORBIDITY WITH BODY MASS INDEX (BMI) OF 33.0 TO 33.9 IN ADULT: Primary | ICD-10-CM

## 2024-04-23 RX ORDER — TIRZEPATIDE 2.5 MG/.5ML
2.5 INJECTION, SOLUTION SUBCUTANEOUS WEEKLY
Qty: 2 ML | Refills: 0 | Status: SHIPPED | OUTPATIENT
Start: 2024-04-23 | End: 2024-05-03

## 2024-04-23 NOTE — TELEPHONE ENCOUNTER
----- Message from Lizy Peterson sent at 4/23/2024  1:06 PM EDT -----  Regarding: Zepbound 5.0 script issues  Contact: [phone]  Hello! I found a pharmacy with 2.5 if I can just continue that since I can’t find any 5.0 to start on 4/26. It may be a few weeks for 5.0 to become available anyway. I added the pharmacy to my list, it’s [facility]. I also confirmed with my insurance that they will approve for me to continue 2.5 with a DAVE from my Dr for prior authorization to [facility] .     Pls let me know if 2.5 can be continued and script called in to that pharmacy.     Thank you!

## 2024-04-24 NOTE — TELEPHONE ENCOUNTER
Quantity limit for Zepbound    Submitted via    []CMM-KEY    []Surescripts-Case ID #    []Faxed to plan   []Other website    [x]Phone call Case ID #      Office notes sent, clinical questions answered. Awaiting determination    Turnaround time for your insurance to make a decision on your Prior Authorization can take 7-21 business days.

## 2024-04-24 NOTE — TELEPHONE ENCOUNTER
Quantity limit for zepbound Denied    Reason: did not miss two consecutive doses.         Message sent to office clinical pool Yes    Denial letter scanned into Media No received via phone    Appeal started No ( Provider will need to decide if appeal is warranted and send clinical documentation to PA team for initiation.)

## 2024-04-25 ENCOUNTER — TELEPHONE (OUTPATIENT)
Dept: BARIATRICS | Facility: CLINIC | Age: 54
End: 2024-04-25

## 2024-04-25 NOTE — TELEPHONE ENCOUNTER
She had sent me a message bout it saying they may approve it based on the shortage. I put a note in the chart if you can send and try to appeal it

## 2024-05-03 DIAGNOSIS — E66.09 CLASS 1 OBESITY DUE TO EXCESS CALORIES WITHOUT SERIOUS COMORBIDITY WITH BODY MASS INDEX (BMI) OF 33.0 TO 33.9 IN ADULT: Primary | ICD-10-CM

## 2024-05-07 DIAGNOSIS — E66.09 CLASS 1 OBESITY DUE TO EXCESS CALORIES WITHOUT SERIOUS COMORBIDITY WITH BODY MASS INDEX (BMI) OF 33.0 TO 33.9 IN ADULT: Primary | ICD-10-CM

## 2024-05-07 RX ORDER — TIRZEPATIDE 5 MG/.5ML
5 INJECTION, SOLUTION SUBCUTANEOUS WEEKLY
Qty: 2 ML | Refills: 1 | Status: SHIPPED | OUTPATIENT
Start: 2024-05-07 | End: 2024-07-02

## 2024-05-09 ENCOUNTER — CLINICAL SUPPORT (OUTPATIENT)
Dept: BARIATRICS | Facility: CLINIC | Age: 54
End: 2024-05-09

## 2024-05-09 VITALS
HEART RATE: 81 BPM | DIASTOLIC BLOOD PRESSURE: 70 MMHG | BODY MASS INDEX: 31.72 KG/M2 | WEIGHT: 168 LBS | TEMPERATURE: 96.9 F | SYSTOLIC BLOOD PRESSURE: 118 MMHG | HEIGHT: 61 IN

## 2024-05-09 DIAGNOSIS — R63.5 ABNORMAL WEIGHT GAIN: Primary | ICD-10-CM

## 2024-05-09 DIAGNOSIS — E78.2 MIXED HYPERLIPIDEMIA: ICD-10-CM

## 2024-05-09 DIAGNOSIS — E66.09 CLASS 1 OBESITY DUE TO EXCESS CALORIES WITHOUT SERIOUS COMORBIDITY WITH BODY MASS INDEX (BMI) OF 33.0 TO 33.9 IN ADULT: Primary | ICD-10-CM

## 2024-05-09 PROCEDURE — RECHECK

## 2024-05-09 RX ORDER — TIRZEPATIDE 7.5 MG/.5ML
7.5 INJECTION, SOLUTION SUBCUTANEOUS WEEKLY
Qty: 2 ML | Refills: 1 | Status: SHIPPED | OUTPATIENT
Start: 2024-05-09 | End: 2024-07-04

## 2024-05-09 NOTE — PROGRESS NOTES
Patient last visit weight:179.6  Patient current visit weight:168    If you are taking phentermine or other oral weight loss medications, are you experiencing any of the following symptoms:  Headache:   Blurred Vision:   Chest Pain:   Palpitations:  Insomnia:   SPECIFY ORAL MEDICATION AND DOSAGE:     If you are taking an injectable medication,  are you experiencing any of the following symptoms:  Bloating: NO  Nausea:NO  Vomiting: NO  Constipation: YES  Diarrhea:NO  SPECIFY INJECTABLE MEDICATION AND CURRENT DOSAGE:ZEPBOUND NEED REFILL 7.5      Vitals:    Is BP less than 100/60?NO  Is BP greater than 140/90?NO  Is HR greater than 100?NO  **If yes to any of the above, have patient relax and repeat in 5-10 minutes**    Repeat values:    Is BP less than 100/60?  Is BP greater than 140/90?  Is HR greater than 100?  **If values remain outside of ranges above, please consult provider for next steps**

## 2024-05-31 DIAGNOSIS — E78.5 HYPERLIPIDEMIA, UNSPECIFIED HYPERLIPIDEMIA TYPE: ICD-10-CM

## 2024-05-31 RX ORDER — ROSUVASTATIN CALCIUM 20 MG/1
20 TABLET, COATED ORAL DAILY
Qty: 90 TABLET | Refills: 1 | Status: SHIPPED | OUTPATIENT
Start: 2024-05-31

## 2024-06-12 DIAGNOSIS — G47.00 INSOMNIA, UNSPECIFIED TYPE: ICD-10-CM

## 2024-06-12 RX ORDER — TRAZODONE HYDROCHLORIDE 100 MG/1
100 TABLET ORAL
Qty: 90 TABLET | Refills: 0 | Status: SHIPPED | OUTPATIENT
Start: 2024-06-12

## 2024-06-14 DIAGNOSIS — G47.00 INSOMNIA, UNSPECIFIED TYPE: ICD-10-CM

## 2024-06-14 RX ORDER — TRAZODONE HYDROCHLORIDE 100 MG/1
100 TABLET ORAL
Qty: 90 TABLET | Refills: 0 | OUTPATIENT
Start: 2024-06-14

## 2024-06-14 NOTE — TELEPHONE ENCOUNTER
Past 14 days   Signed 2 days ago (6/12/2024):   traZODone (DESYREL) 100 mg tablet   Sig: Take 1 tablet (100 mg total) by mouth once daily   Disp: 90 tablet    Refills: 0   Signed by: Virginia Flores DO

## 2024-06-19 ENCOUNTER — PATIENT MESSAGE (OUTPATIENT)
Dept: FAMILY MEDICINE CLINIC | Facility: CLINIC | Age: 54
End: 2024-06-19

## 2024-06-19 NOTE — LETTER
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          Sincerely,         Dr. Virginia Flores

## 2024-06-20 ENCOUNTER — OFFICE VISIT (OUTPATIENT)
Dept: URGENT CARE | Facility: CLINIC | Age: 54
End: 2024-06-20
Payer: COMMERCIAL

## 2024-06-20 VITALS
WEIGHT: 164.6 LBS | OXYGEN SATURATION: 96 % | BODY MASS INDEX: 31.08 KG/M2 | HEIGHT: 61 IN | RESPIRATION RATE: 18 BRPM | DIASTOLIC BLOOD PRESSURE: 71 MMHG | HEART RATE: 81 BPM | TEMPERATURE: 97.2 F | SYSTOLIC BLOOD PRESSURE: 125 MMHG

## 2024-06-20 DIAGNOSIS — M25.512 ACUTE PAIN OF LEFT SHOULDER: Primary | ICD-10-CM

## 2024-06-20 LAB
ATRIAL RATE: 73 BPM
ATRIAL RATE: 73 BPM
P AXIS: 50 DEGREES
P AXIS: 50 DEGREES
PR INTERVAL: 148 MS
PR INTERVAL: 148 MS
QRS AXIS: 49 DEGREES
QRS AXIS: 49 DEGREES
QRSD INTERVAL: 96 MS
QRSD INTERVAL: 96 MS
QT INTERVAL: 418 MS
QT INTERVAL: 418 MS
QTC INTERVAL: 460 MS
QTC INTERVAL: 460 MS
T WAVE AXIS: 47 DEGREES
T WAVE AXIS: 47 DEGREES
VENTRICULAR RATE: 73 BPM
VENTRICULAR RATE: 73 BPM

## 2024-06-20 PROCEDURE — 93005 ELECTROCARDIOGRAM TRACING: CPT

## 2024-06-20 PROCEDURE — G0383 LEV 4 HOSP TYPE B ED VISIT: HCPCS

## 2024-06-20 PROCEDURE — 93010 ELECTROCARDIOGRAM REPORT: CPT | Performed by: STUDENT IN AN ORGANIZED HEALTH CARE EDUCATION/TRAINING PROGRAM

## 2024-06-20 PROCEDURE — S9083 URGENT CARE CENTER GLOBAL: HCPCS

## 2024-06-20 RX ORDER — PREDNISONE 10 MG/1
TABLET ORAL
Qty: 21 TABLET | Refills: 0 | Status: SHIPPED | OUTPATIENT
Start: 2024-06-20 | End: 2024-06-26

## 2024-06-20 NOTE — PROGRESS NOTES
[facility]        NAME: Lizy Peterson is a 53 y.o. female  : 1970    MRN: [id number]  DATE: 2024  TIME: 8:37 AM      Assessment and Plan     Acute pain of left shoulder [M25.512]  1. Acute pain of left shoulder  ECG 12 lead    predniSONE 10 mg tablet    Ambulatory Referral to Orthopedic Surgery        EKG shows rate 73 bpm, no acute ST abnormalities noted. Patient educated and verbalizes understanding to proceed to the ER if symptoms worsen.     Patient Instructions     Take steroids as directed. Recommend to take them in the morning and with food.   Recommend lidocaine patches OTC such as salon pas.   Recommend gentle stretches of the arm to prevent frozen shoulder.   Ice or heat as needed.  Acetaminophen or ibuprofen for pain.  Follow-up with orthopedics as needed.   PCP follow-up in 3-5 days  Proceed to the ER if symptoms worsen.   Chief Complaint     Chief Complaint   Patient presents with    Shoulder Pain     Pt presents with left shoulder pain that started yesterday. Pt felt ache in shoulder when driving home from work and then pain worsened. Pt notes decreased ROM. Feels like a pinched nerve per pt.         History of Present Illness     Patient is a 53-year-old female who presents with left shoulder pain for one day. Reports pain started while she was driving from work. States she was doing a lot of moving around the office but does not remember direct, traumatic injury. Denies previous injury/surgery to shoulder. Reports stiffness and decreased ROM with associated numbness. Denies chest pain, SOB, fever, jaw pain. Denies cardiac history.     Shoulder Pain   Associated symptoms include numbness. Pertinent negatives include no fever.       Review of Systems     Review of Systems   Constitutional:  Negative for fever.   Respiratory:  Negative for cough and shortness of breath.    Musculoskeletal:  Positive for arthralgias. Negative for joint swelling.   Neurological:  Positive for  numbness.   All other systems reviewed and are negative.        Current Medications       Current Outpatient Medications:     fluticasone (FLONASE) 50 mcg/act nasal spray, SPRAY 1 SPRAY INTO EACH NOSTRIL TWO TIMES A DAY AS NEEDED (Patient taking differently: as needed), Disp: 48 mL, Rfl: 2    LORazepam (ATIVAN) 1 mg tablet, Take 0.5 tablets (0.5 mg total) by mouth daily as needed for anxiety Avoid driving/working while taking due to sedation (Patient taking differently: Take 0.5 mg by mouth as needed for anxiety Avoid driving/working while taking due to sedation), Disp: 30 tablet, Rfl: 0    Multiple Vitamin (MULTI VITAMIN DAILY PO), Take 3 tablets by mouth daily gummies, Disp: , Rfl:     predniSONE 10 mg tablet, Take 6 tablets (60 mg total) by mouth daily for 1 day, THEN 5 tablets (50 mg total) daily for 1 day, THEN 4 tablets (40 mg total) daily for 1 day, THEN 3 tablets (30 mg total) daily for 1 day, THEN 2 tablets (20 mg total) daily for 1 day, THEN 1 tablet (10 mg total) daily for 1 day., Disp: 21 tablet, Rfl: 0    rosuvastatin (CRESTOR) 20 MG tablet, Take 1 tablet (20 mg total) by mouth daily, Disp: 90 tablet, Rfl: 1    tirzepatide (Zepbound) 5 mg/0.5 mL auto-injector, Inject 0.5 mL (5 mg total) under the skin once a week, Disp: 2 mL, Rfl: 1    tirzepatide (Zepbound) 7.5 mg/0.5 mL auto-injector, Inject 0.5 mL (7.5 mg total) under the skin once a week, Disp: 2 mL, Rfl: 1    traZODone (DESYREL) 100 mg tablet, Take 1 tablet (100 mg total) by mouth once daily, Disp: 90 tablet, Rfl: 0    nicotine (NICODERM CQ) 21 mg/24 hr TD 24 hr patch, Place 1 patch on the skin over 24 hours every 24 hours For 6 weeks, then call for next dose (Patient not taking: Reported on 5/9/2024), Disp: 28 patch, Rfl: 1    Current Allergies     Allergies as of 06/20/2024 - Reviewed 06/20/2024   Allergen Reaction Noted    Amoxicillin Abdominal Pain 02/16/2007    Augmentin [amoxicillin-pot clavulanate] GI Intolerance 10/14/2016    Clavulanic  "acid Abdominal Pain 2007    Phenobarbital Other (See Comments) and Delirium 2007              The following portions of the patient's history were reviewed and updated as appropriate: allergies, current medications, past family history, past medical history, past social history, past surgical history and problem list.     Past Medical History:   Diagnosis Date    Abnormal uterine bleeding (AUB)     spotting between periods    Anxiety     Depression     history of depression    Dysmenorrhea     Fibroid 10/17/2019    Insomnia     Recurrent pregnancy loss, antepartum condition or complication     Tubal pregnancy     Uterine polyp     Wears glasses        Past Surgical History:   Procedure Laterality Date    AUGMENTATION MAMMAPLASTY Bilateral     BREAST SURGERY      biopsy; implants     SECTION      EYE SURGERY Right     lazy eye correction     HEMORROIDECTOMY      OOPHORECTOMY Left     [place] ARTHROSCOPY ELBOW SURGICAL DEBRIDEMENT EXTENSIVE Right 10/17/2016    Procedure: ARTHROSCOPY ELBOW WITH LATERAL EPICONDYLE DEBRIDMENT; ECRB RELEASE;  Surgeon: Landry Fernando MD;  Location: [facility];  Service: Orthopedics    [place] HYSTEROSCOPY BX ENDOMETRIUM&/POLYPC W/WO D&C N/A 2016    Procedure: DILATATION AND CURETTAGE (D&C) WITH HYSTEROSCOPY POLYPECTOMY;  Surgeon: Eric Yuen MD;  Location: [facility];  Service: Gynecology    WISDOM TOOTH EXTRACTION         Family History   Problem Relation Age of Onset    Hypertension Mother     Rheum arthritis Father     Hypertension Sister     No Known Problems Daughter     No Known Problems Daughter     Lung cancer Maternal Grandmother 80    No Known Problems Maternal Grandfather     Breast cancer Paternal Grandmother 30    No Known Problems Paternal Grandfather     Cancer Maternal Aunt 45    No Known Problems Maternal Aunt          Medications have been verified.        Objective     /71   Pulse 81   Temp (!) 97.2 °F (36.2 °C)   Resp 18   Ht 5' 1\" (1.549 " m)   Wt 74.7 kg (164 lb 9.6 oz)   LMP  (LMP Unknown)   SpO2 96%   BMI 31.10 kg/m²   No LMP recorded (lmp unknown). Patient is postmenopausal.         Physical Exam     Physical Exam  Vitals and nursing note reviewed.   Constitutional:       General: She is awake. She is not in acute distress.     Appearance: Normal appearance. She is not ill-appearing, toxic-appearing or diaphoretic.   Cardiovascular:      Rate and Rhythm: Normal rate.      Pulses: Normal pulses.      Heart sounds: Normal heart sounds, S1 normal and S2 normal.   Pulmonary:      Effort: Pulmonary effort is normal.      Breath sounds: Normal breath sounds and air entry.   Musculoskeletal:      Left shoulder: Tenderness present. No swelling, deformity, bony tenderness or crepitus. Decreased range of motion. Normal strength. Normal pulse.      Left hand: No swelling.        Arms:    Skin:     General: Skin is warm.      Capillary Refill: Capillary refill takes less than 2 seconds.   Neurological:      Mental Status: She is alert.   Psychiatric:         Mood and Affect: Mood normal.         Behavior: Behavior normal.         Thought Content: Thought content normal.         Judgment: Judgment normal.

## 2024-06-20 NOTE — LETTER
June 20, 2024     Patient: Lizy Peterson   YOB: 1970   Date of Visit: 6/20/2024       To Whom It May Concern:    It is my medical opinion that Lizy Peterson may return to work on 6/21/24         Sincerely,        ALEJANDRA Garcia    CC: No Recipients

## 2024-06-20 NOTE — PATIENT INSTRUCTIONS
Take steroids as directed. Recommend to take them in the morning and with food.   Recommend lidocaine patches OTC such as salon pas.   Recommend gentle stretches of the arm to prevent frozen shoulder.   Ice or heat as needed.  Acetaminophen or ibuprofen for pain.  Follow-up with orthopedics as needed.   PCP follow-up in 3-5 days  Proceed to the ER if symptoms worsen.

## 2024-06-27 DIAGNOSIS — E66.09 CLASS 1 OBESITY DUE TO EXCESS CALORIES WITHOUT SERIOUS COMORBIDITY WITH BODY MASS INDEX (BMI) OF 33.0 TO 33.9 IN ADULT: Primary | ICD-10-CM

## 2024-06-27 RX ORDER — TIRZEPATIDE 10 MG/.5ML
10 INJECTION, SOLUTION SUBCUTANEOUS WEEKLY
Qty: 2 ML | Refills: 1 | Status: SHIPPED | OUTPATIENT
Start: 2024-06-27 | End: 2024-08-22

## 2024-07-09 ENCOUNTER — OFFICE VISIT (OUTPATIENT)
Dept: FAMILY MEDICINE CLINIC | Facility: CLINIC | Age: 54
End: 2024-07-09
Payer: COMMERCIAL

## 2024-07-09 VITALS
SYSTOLIC BLOOD PRESSURE: 120 MMHG | DIASTOLIC BLOOD PRESSURE: 80 MMHG | BODY MASS INDEX: 30.23 KG/M2 | OXYGEN SATURATION: 96 % | TEMPERATURE: 97.9 F | WEIGHT: 160 LBS | HEART RATE: 89 BPM

## 2024-07-09 DIAGNOSIS — E78.2 MIXED HYPERLIPIDEMIA: Primary | ICD-10-CM

## 2024-07-09 DIAGNOSIS — K21.9 GASTROESOPHAGEAL REFLUX DISEASE WITHOUT ESOPHAGITIS: ICD-10-CM

## 2024-07-09 DIAGNOSIS — F41.9 ANXIETY: ICD-10-CM

## 2024-07-09 DIAGNOSIS — Z87.891 PERSONAL HISTORY OF TOBACCO USE, PRESENTING HAZARDS TO HEALTH: ICD-10-CM

## 2024-07-09 DIAGNOSIS — G47.00 INSOMNIA, UNSPECIFIED TYPE: ICD-10-CM

## 2024-07-09 PROBLEM — F17.200 CURRENT EVERY DAY SMOKER: Status: RESOLVED | Noted: 2018-07-10 | Resolved: 2024-07-09

## 2024-07-09 PROCEDURE — 99214 OFFICE O/P EST MOD 30 MIN: CPT | Performed by: FAMILY MEDICINE

## 2024-07-09 RX ORDER — TRAZODONE HYDROCHLORIDE 100 MG/1
TABLET ORAL
Start: 2024-07-09

## 2024-07-09 NOTE — PATIENT INSTRUCTIONS
Increase trazodone to 150 mg nightly    Labs look great!    Return end of year for physical exam    NICODERM!    CT chest

## 2024-07-09 NOTE — PROGRESS NOTES
FAMILY PRACTICE OFFICE VISIT    NAME: Lizy Peterson    AGE: 53 y.o.   SEX: female  : 1970   MRN: [id number]    DATE: 2024  TIME: 1:33 PM    Assessment and Plan     1. Mixed hyperlipidemia  Great control and improvement overall  Taking statin and has lost weight  If at f/u #'s continue to remain very well controlled - will consider decreasing crestor from 20 mg to 10 mg daily.      2. Personal history of tobacco use, presenting hazards to health  Obtain baseline CT lung cancer screening  New rx given  Pt has nicoderm patch Rx - and is considering starting         3. Anxiety  Stable overall with prn use of ativan  Updated controlled substance agreement.    ALISON- 7 score of 7  PHQ score of 2    Note - pt with ongoing sleeping difficulties  Will incresae trazodone to 150 mg nightly  And pt will provide update.        4. Gastroesophageal reflux disease without esophagitis  Taking PPI - which is helping  Symptoms med related    Goes to gyn    Patient Instructions   Increase trazodone to 150 mg nightly    Labs look great!    Return end of year for physical exam    NICODERM!    CT chest    Vaccines up to date  Including prevnar 20    Chief Complaint     Chief Complaint   Patient presents with    Follow-up       History of Present Illness   Lizy Peterson is a 53 y.o.-year-old female who presents today for routine visit    Going to weight management  And has lost 25 # so far  Feels more tired on most recent increase to 10 mg dose.  Also getting large welts on leg.  So pt may back down to prior dose      Review of Systems   Review of Systems   Constitutional:  Positive for unexpected weight change.        Has done well with weight loss since starting zepbound - over the past 3 mos     Respiratory:  Negative for cough, shortness of breath and wheezing.    Cardiovascular:  Negative for chest pain and palpitations.   Gastrointestinal:  Positive for constipation.        GERD since starting on GLP - 1  Taking PPI over the  counter with some relief.     Genitourinary:  Negative for vaginal bleeding.        Last menses X years ago.     Skin:         Skin tag - left side of neck  Was removed in past and grew back  Gets irritated by jewelry     Psychiatric/Behavioral:  Positive for sleep disturbance.         Does have difficulty sleeping at times.         Active Problem List     Patient Active Problem List   Diagnosis    Anxiety    Hyperlipidemia    Insomnia    Lateral epicondylitis of right elbow    Notalgia paresthetica    Current every day smoker    Uterine fibroid    Irregular bleeding    Bilateral foot pain    Class 1 obesity due to excess calories without serious comorbidity with body mass index (BMI) of 33.0 to 33.9 in adult    Vaginal atrophy    Menopause    Chronic bilateral low back pain without sciatica         Past Medical History:  Past Medical History:   Diagnosis Date    Abnormal uterine bleeding (AUB)     spotting between periods    Anxiety     Depression     history of depression    Dysmenorrhea     Fibroid 10/17/2019    Insomnia     Recurrent pregnancy loss, antepartum condition or complication     Tubal pregnancy     Uterine polyp     Wears glasses        Past Surgical History:  Past Surgical History:   Procedure Laterality Date    AUGMENTATION MAMMAPLASTY Bilateral 2009    BREAST SURGERY      biopsy; implants     SECTION      EYE SURGERY Right     lazy eye correction     HEMORROIDECTOMY      OOPHORECTOMY Left     MA ARTHROSCOPY ELBOW SURGICAL DEBRIDEMENT EXTENSIVE Right 10/17/2016    Procedure: ARTHROSCOPY ELBOW WITH LATERAL EPICONDYLE DEBRIDMENT; ECRB RELEASE;  Surgeon: Landry Fernando MD;  Location: [facility];  Service: Orthopedics    MA HYSTEROSCOPY BX ENDOMETRIUM&/POLYPC W/WO D&C N/A 2016    Procedure: DILATATION AND CURETTAGE (D&C) WITH HYSTEROSCOPY POLYPECTOMY;  Surgeon: Eric Yuen MD;  Location: [facility];  Service: Gynecology    WISDOM TOOTH EXTRACTION         Family History:  Family History    Problem Relation Age of Onset    Hypertension Mother     Rheum arthritis Father     Hypertension Sister     No Known Problems Daughter     No Known Problems Daughter     Lung cancer Maternal Grandmother 80    No Known Problems Maternal Grandfather     Breast cancer Paternal Grandmother 30    No Known Problems Paternal Grandfather     Cancer Maternal Aunt 45    No Known Problems Maternal Aunt        Social History:  Social History     Socioeconomic History    Marital status: /Civil Union     Spouse name: Not on file    Number of children: Not on file    Years of education: Not on file    Highest education level: Not on file   Occupational History    Not on file   Tobacco Use    Smoking status: Every Day     Current packs/day: 1.00     Average packs/day: 1 pack/day for 4.0 years (4.0 ttl pk-yrs)     Types: Cigarettes    Smokeless tobacco: Never    Tobacco comments:     quit for 20yrs but started smoking again 4yr ago   Vaping Use    Vaping status: Never Used   Substance and Sexual Activity    Alcohol use: Yes     Comment: socially    Drug use: No    Sexual activity: Yes     Birth control/protection: None, Male Sterilization   Other Topics Concern    Not on file   Social History Narrative    Not on file     Social Determinants of Health     Financial Resource Strain: Not on file   Food Insecurity: Not on file   Transportation Needs: Not on file   Physical Activity: Not on file   Stress: Not on file   Social Connections: Not on file   Intimate Partner Violence: Not on file   Housing Stability: Not on file       Objective     Vitals:    07/09/24 1300   BP: 120/80   Pulse: 89   Temp: 97.9 °F (36.6 °C)   SpO2: 96%     Wt Readings from Last 3 Encounters:   07/09/24 72.6 kg (160 lb)   06/20/24 74.7 kg (164 lb 9.6 oz)   05/09/24 76.2 kg (168 lb)       Physical Exam  Vitals and nursing note reviewed.   Constitutional:       General: She is not in acute distress.     Appearance: Normal appearance. She is not  "ill-appearing or toxic-appearing.   Cardiovascular:      Rate and Rhythm: Normal rate and regular rhythm.      Pulses: Normal pulses.      Heart sounds: Normal heart sounds. No murmur heard.  Pulmonary:      Effort: Pulmonary effort is normal. No respiratory distress.      Breath sounds: Normal breath sounds. No wheezing, rhonchi or rales.   Musculoskeletal:      Cervical back: Neck supple. No tenderness.      Right lower leg: No edema.      Left lower leg: No edema.   Lymphadenopathy:      Cervical: No cervical adenopathy.   Skin:     Comments: Fleshy colored - dangling papilloma left anterior neck   Neurological:      General: No focal deficit present.      Mental Status: She is alert and oriented to person, place, and time.   Psychiatric:         Mood and Affect: Mood normal.         Behavior: Behavior normal.         Thought Content: Thought content normal.         Judgment: Judgment normal.         Pertinent Laboratory/Diagnostic Studies:  Lab Results   Component Value Date    GLUCOSE 80 11/25/2015    BUN 16 04/16/2024    CREATININE 0.77 04/16/2024    CALCIUM 9.3 04/16/2024     11/25/2015    K 3.9 04/16/2024    CO2 26 04/16/2024     04/16/2024     Lab Results   Component Value Date    ALT 11 04/16/2024    AST 17 04/16/2024    ALKPHOS 48 04/16/2024    BILITOT 0.37 11/25/2015       Lab Results   Component Value Date    WBC 9.49 09/27/2016    HGB 15.1 09/27/2016    HCT 42.6 11/22/2016    MCV 97 09/27/2016     09/27/2016       Lab Results   Component Value Date    TSH 2.57 06/22/2023       Lab Results   Component Value Date    CHOL 231 11/25/2015     Lab Results   Component Value Date    TRIG 77 04/16/2024     Lab Results   Component Value Date    HDL 55 04/16/2024     Lab Results   Component Value Date    LDLCALC 75 04/16/2024     No results found for: \"HGBA1C\"    Results for orders placed or performed in visit on 06/20/24   ECG 12 lead   Result Value Ref Range    Ventricular Rate 73 BPM    " Atrial Rate 73 BPM    AR Interval 148 ms    QRSD Interval 96 ms    QT Interval 418 ms    QTC Interval 460 ms    P Axis 50 degrees    QRS Axis 49 degrees    T Wave Axis 47 degrees   ECG 12 lead   Result Value Ref Range    Ventricular Rate 73 BPM    Atrial Rate 73 BPM    AR Interval 148 ms    QRSD Interval 96 ms    QT Interval 418 ms    QTC Interval 460 ms    P Axis 50 degrees    QRS Axis 49 degrees    T Wave Axis 47 degrees       No orders of the defined types were placed in this encounter.      ALLERGIES:  Allergies   Allergen Reactions    Amoxicillin Abdominal Pain    Augmentin [Amoxicillin-Pot Clavulanate] GI Intolerance     md harris's use of cefazolin for 0R on 10/17/16.( Dr. Fernando)    Clavulanic Acid Abdominal Pain    Phenobarbital Other (See Comments) and Delirium     hallucinations       Current Medications     Current Outpatient Medications   Medication Sig Dispense Refill    fluticasone (FLONASE) 50 mcg/act nasal spray SPRAY 1 SPRAY INTO EACH NOSTRIL TWO TIMES A DAY AS NEEDED (Patient taking differently: as needed) 48 mL 2    LORazepam (ATIVAN) 1 mg tablet Take 0.5 tablets (0.5 mg total) by mouth daily as needed for anxiety Avoid driving/working while taking due to sedation (Patient taking differently: Take 0.5 mg by mouth as needed for anxiety Avoid driving/working while taking due to sedation) 30 tablet 0    Multiple Vitamin (MULTI VITAMIN DAILY PO) Take 3 tablets by mouth daily gummies      rosuvastatin (CRESTOR) 20 MG tablet Take 1 tablet (20 mg total) by mouth daily 90 tablet 1    tirzepatide (Zepbound) 10 mg/0.5 mL auto-injector Inject 0.5 mL (10 mg total) under the skin once a week 2 mL 1    traZODone (DESYREL) 100 mg tablet Take 1 tablet (100 mg total) by mouth once daily 90 tablet 0    nicotine (NICODERM CQ) 21 mg/24 hr TD 24 hr patch Place 1 patch on the skin over 24 hours every 24 hours For 6 weeks, then call for next dose (Patient not taking: Reported on 5/9/2024) 28 patch 1     No current  facility-administered medications for this visit.         Health Maintenance     Health Maintenance   Topic Date Due    PT PLAN OF CARE  Never done    Osteoporosis Screening  Never done    COVID-19 Vaccine (4 - 2023-24 season) 09/01/2023    Influenza Vaccine (1) 09/01/2024    Pneumococcal Vaccine: Pediatrics (0 to 5 Years) and At-Risk Patients (6 to 64 Years) (2 of 2 - PCV) 08/17/2024 (Originally 9/8/2021)    Annual Physical  11/02/2024    Breast Cancer Screening: Mammogram  11/13/2024    Depression Screening  07/09/2025    Cervical Cancer Screening  10/27/2025    Colorectal Cancer Screening  05/06/2027    DTaP,Tdap,and Td Vaccines (2 - Td or Tdap) 05/25/2027    RSV Vaccine Age 60+ Years (1 - 1-dose 60+ series) 10/02/2028    HIV Screening  Completed    Hepatitis C Screening  Completed    Zoster Vaccine  Completed    RSV Vaccine age 0-20 Months  Aged Out    HIB Vaccine  Aged Out    IPV Vaccine  Aged Out    Hepatitis A Vaccine  Aged Out    Meningococcal ACWY Vaccine  Aged Out    HPV Vaccine  Aged Out     Immunization History   Administered Date(s) Administered    COVID-19 PFIZER VACCINE 0.3 ML IM 03/29/2021, 04/21/2021, 01/22/2022    Influenza Quadrivalent Preservative Free 3 years and older IM 12/08/2014    Influenza Quadrivalent, 6-35 Months IM 09/08/2016    Influenza, injectable, quadrivalent, preservative free 0.5 mL 10/18/2023    Influenza, recombinant, quadrivalent,injectable, preservative free 12/03/2019, 11/25/2020    Influenza, seasonal, injectable 11/13/2007, 11/24/2008, 10/25/2013    Influenza, seasonal, injectable, preservative free 10/02/2009    Pneumococcal Polysaccharide PPV23 09/08/2020    Tdap 05/25/2017    Zoster Vaccine Recombinant 08/17/2023, 10/18/2023       Depression Screening and Follow-up Plan: Patient was screened for depression during today's encounter. They screened negative with a PHQ-2 score of 0.    Lung Cancer Screening Shared Decision Making: I discussed with her that she is a  candidate for lung cancer CT screening.     The following Shared Decision-Making points were covered:  Benefits of screening were discussed, including the rates of reduction in death from lung cancer and other causes.  Harms of screening were reviewed, including false positive tests, radiation exposure levels, risks of invasive procedures, risks of complications of screening, and risk of overdiagnosis.  I counseled on the importance of adherence to annual lung cancer LDCT screening, impact of co-morbidities, and ability or willingness to undergo diagnosis and treatment.  I counseled on the importance of maintaining abstinence as a former smoker or was counseled on the importance of smoking cessation if a current smoker    Review of Eligibility Criteria: She meets all of the criteria for Lung Cancer Screening.   - She is 53 y.o.   - She has 20 pack year tobacco history and is a current smoker or has quit within the past 15 years  - She presents no signs or symptoms of lung cancer    After discussion, the patient decided to elect lung cancer screening.        Virginia Flores, DO

## 2024-07-23 DIAGNOSIS — E66.09 CLASS 1 OBESITY DUE TO EXCESS CALORIES WITHOUT SERIOUS COMORBIDITY WITH BODY MASS INDEX (BMI) OF 33.0 TO 33.9 IN ADULT: Primary | ICD-10-CM

## 2024-07-23 RX ORDER — ISOPROPYL ALCOHOL 0.7 ML/ML
SWAB TOPICAL
Qty: 100 EACH | Refills: 0 | Status: SHIPPED | OUTPATIENT
Start: 2024-07-23

## 2024-07-25 DIAGNOSIS — E66.09 CLASS 1 OBESITY DUE TO EXCESS CALORIES WITHOUT SERIOUS COMORBIDITY WITH BODY MASS INDEX (BMI) OF 33.0 TO 33.9 IN ADULT: Primary | ICD-10-CM

## 2024-07-25 RX ORDER — TIRZEPATIDE 12.5 MG/.5ML
12.5 INJECTION, SOLUTION SUBCUTANEOUS WEEKLY
Qty: 2 ML | Refills: 1 | Status: SHIPPED | OUTPATIENT
Start: 2024-07-25 | End: 2024-09-19

## 2024-07-31 ENCOUNTER — OFFICE VISIT (OUTPATIENT)
Dept: BARIATRICS | Facility: CLINIC | Age: 54
End: 2024-07-31
Payer: COMMERCIAL

## 2024-07-31 VITALS
DIASTOLIC BLOOD PRESSURE: 70 MMHG | WEIGHT: 155.6 LBS | HEART RATE: 50 BPM | SYSTOLIC BLOOD PRESSURE: 101 MMHG | TEMPERATURE: 98.1 F | BODY MASS INDEX: 29.38 KG/M2 | HEIGHT: 61 IN | RESPIRATION RATE: 17 BRPM

## 2024-07-31 DIAGNOSIS — E66.3 OVERWEIGHT: Primary | ICD-10-CM

## 2024-07-31 DIAGNOSIS — K21.9 GASTROESOPHAGEAL REFLUX DISEASE WITHOUT ESOPHAGITIS: ICD-10-CM

## 2024-07-31 DIAGNOSIS — E78.5 HYPERLIPIDEMIA: ICD-10-CM

## 2024-07-31 DIAGNOSIS — K59.00 CONSTIPATION: ICD-10-CM

## 2024-07-31 PROCEDURE — 99214 OFFICE O/P EST MOD 30 MIN: CPT | Performed by: PHYSICIAN ASSISTANT

## 2024-07-31 NOTE — ASSESSMENT & PLAN NOTE
-Patient is pursuing Conservative Program  -Initial weight loss goal of 5-10% weight loss for improved health  -Screening labs and records reviewed from 10/17/23 with CMP  - STOP BANG-1/8    Goals:  -continue 3 well rounded meals a day , making sure to get protein  - To drink at least 64oz of water daily.No sugary beverages.  -discussed trying to increase exercise    To continue on zepbound. 13.4% weight loss.  She has had constipation. Discussed doing fiber supplement and miralax.  To contact the office if getting worse.  She had been doing magnsium also and could not increase to more than 400mg daily.    Medication agreement signed 3/28/24      Initial Weight:179.6  Current Weight: 155.6  Change: -24lb  Goal Weight:140

## 2024-07-31 NOTE — PROGRESS NOTES
Assessment/Plan:    Overweight    -Patient is pursuing Conservative Program  -Initial weight loss goal of 5-10% weight loss for improved health  -Screening labs and records reviewed from 10/17/23 with CMP  - STOP BANG-1/8    Goals:  -continue 3 well rounded meals a day , making sure to get protein  - To drink at least 64oz of water daily.No sugary beverages.  -discussed trying to increase exercise    To continue on zepbound. 13.4% weight loss.  She has had constipation. Discussed doing fiber supplement and miralax.  To contact the office if getting worse.  She had been doing magnsium also and could not increase to more than 400mg daily.    Medication agreement signed 3/28/24      Initial Weight:179.6  Current Weight: 155.6  Change: -24lb  Goal Weight:140       Gastroesophageal reflux disease without esophagitis  Takes omeprazole if needed    Hyperlipidemia  Not on medication  -should improve with weight loss, dietary, and lifestyle changes          Return in about 6 months (around 1/31/2025)and  for 3 month nurse visit. .       Diagnoses and all orders for this visit:    Overweight    Gastroesophageal reflux disease without esophagitis    Constipation    Hyperlipidemia          Subjective:   Chief Complaint   Patient presents with    Follow-up     MWM 4M F/u; Waist-31.5in        Patient ID: Lizy Peterson  is a 53 y.o. female with excess weight/obesity here to pursue weight managment.  Patient is pursuing Conservative Program.     HPI  She is on zepbound 12.5mg .  She has had constipation. She tried magnesium citrate and it is not helping.  She was increasing the dose and then took laxative which helped. Overall she is feeling better and has increased energy  Wt Readings from Last 10 Encounters:   07/31/24 70.6 kg (155 lb 9.6 oz)   07/09/24 72.6 kg (160 lb)   06/20/24 74.7 kg (164 lb 9.6 oz)   05/09/24 76.2 kg (168 lb)   03/28/24 81.5 kg (179 lb 9.6 oz)   11/13/23 79.8 kg (175 lb 14.8 oz)   11/02/23 79.8 kg (176 lb)    10/18/23 80.3 kg (177 lb)   23 77.7 kg (171 lb 3.2 oz)   23 76.7 kg (169 lb)       Food logging:  Increased appetite/cravings:  Exercise:trying to  increase-yoga and floor exercise  Hydration:water intake 60 oz, iced coffee      The following portions of the patient's history were reviewed and updated as appropriate: She  has a past medical history of Abnormal uterine bleeding (AUB), Anxiety, Depression, Dysmenorrhea, Fibroid (10/17/2019), Insomnia, Recurrent pregnancy loss, antepartum condition or complication, Tubal pregnancy, Uterine polyp, and Wears glasses.  She   Patient Active Problem List    Diagnosis Date Noted    Personal history of tobacco use, presenting hazards to health 2024    Gastroesophageal reflux disease without esophagitis 2024    Chronic bilateral low back pain without sciatica 2020    Vaginal atrophy 10/22/2020    Menopause 10/22/2020    Overweight 2020    Bilateral foot pain 2019    Uterine fibroid 10/17/2019    Irregular bleeding 10/17/2019    Notalgia paresthetica 07/10/2018    Hyperlipidemia 2017    Lateral epicondylitis of right elbow 08/10/2015    Anxiety 10/25/2013    Insomnia 10/02/2012     She  has a past surgical history that includes Breast surgery; Eye surgery (Right);  section; Hemorroidectomy; Webber tooth extraction; pr hysteroscopy bx endometrium&/polypc w/wo d&c (N/A, 2016); pr arthroscopy elbow surgical debridement extensive (Right, 10/17/2016); Augmentation mammaplasty (Bilateral, ); and Oophorectomy (Left).  Her family history includes Breast cancer (age of onset: 30) in her paternal grandmother; Cancer (age of onset: 45) in her maternal aunt; Hypertension in her mother and sister; Lung cancer (age of onset: 80) in her maternal grandmother; No Known Problems in her daughter, daughter, maternal aunt, maternal grandfather, and paternal grandfather; Rheum arthritis in her father.  She  reports that she has been  smoking cigarettes. She has a 4 pack-year smoking history. She has never used smokeless tobacco. She reports current alcohol use. She reports that she does not use drugs.  Current Outpatient Medications   Medication Sig Dispense Refill    Alcohol Swabs (Pharmacist Choice Alcohol) PADS AS DIRECTED 100 each 0    fluticasone (FLONASE) 50 mcg/act nasal spray SPRAY 1 SPRAY INTO EACH NOSTRIL TWO TIMES A DAY AS NEEDED (Patient taking differently: as needed) 48 mL 2    LORazepam (ATIVAN) 1 mg tablet Take 0.5 tablets (0.5 mg total) by mouth daily as needed for anxiety Avoid driving/working while taking due to sedation (Patient taking differently: Take 0.5 mg by mouth as needed for anxiety Avoid driving/working while taking due to sedation) 30 tablet 0    Multiple Vitamin (MULTI VITAMIN DAILY PO) Take 3 tablets by mouth daily gummies      rosuvastatin (CRESTOR) 20 MG tablet Take 1 tablet (20 mg total) by mouth daily 90 tablet 1    tirzepatide (Zepbound) 12.5 mg/0.5 mL auto-injector Inject 0.5 mL (12.5 mg total) under the skin once a week 2 mL 1    traZODone (DESYREL) 100 mg tablet Take 1.5 tabs (for a total of 150 mg) po nightly ; dose increased as of 7/9/2024      tirzepatide (Zepbound) 10 mg/0.5 mL auto-injector Inject 0.5 mL (10 mg total) under the skin once a week (Patient not taking: Reported on 7/31/2024) 2 mL 1     No current facility-administered medications for this visit.     Current Outpatient Medications on File Prior to Visit   Medication Sig    Alcohol Swabs (Pharmacist Choice Alcohol) PADS AS DIRECTED    fluticasone (FLONASE) 50 mcg/act nasal spray SPRAY 1 SPRAY INTO EACH NOSTRIL TWO TIMES A DAY AS NEEDED (Patient taking differently: as needed)    LORazepam (ATIVAN) 1 mg tablet Take 0.5 tablets (0.5 mg total) by mouth daily as needed for anxiety Avoid driving/working while taking due to sedation (Patient taking differently: Take 0.5 mg by mouth as needed for anxiety Avoid driving/working while taking due to  "sedation)    Multiple Vitamin (MULTI VITAMIN DAILY PO) Take 3 tablets by mouth daily gummies    rosuvastatin (CRESTOR) 20 MG tablet Take 1 tablet (20 mg total) by mouth daily    tirzepatide (Zepbound) 12.5 mg/0.5 mL auto-injector Inject 0.5 mL (12.5 mg total) under the skin once a week    traZODone (DESYREL) 100 mg tablet Take 1.5 tabs (for a total of 150 mg) po nightly ; dose increased as of 7/9/2024    tirzepatide (Zepbound) 10 mg/0.5 mL auto-injector Inject 0.5 mL (10 mg total) under the skin once a week (Patient not taking: Reported on 7/31/2024)     No current facility-administered medications on file prior to visit.     She is allergic to amoxicillin, augmentin [amoxicillin-pot clavulanate], clavulanic acid, and phenobarbital..    Review of Systems   Constitutional:  Negative for fever.   Respiratory:  Negative for shortness of breath.    Cardiovascular:  Negative for chest pain and palpitations.   Gastrointestinal:  Positive for constipation. Negative for abdominal pain, diarrhea and vomiting.   Genitourinary:  Negative for difficulty urinating.   Skin:  Negative for rash.   Neurological:  Negative for headaches.   Psychiatric/Behavioral:  Negative for dysphoric mood. The patient is not nervous/anxious.        Objective:    /70   Pulse (!) 50   Temp 98.1 °F (36.7 °C)   Resp 17   Ht 5' 1\" (1.549 m)   Wt 70.6 kg (155 lb 9.6 oz)   LMP  (LMP Unknown)   BMI 29.40 kg/m²      Physical Exam  Vitals and nursing note reviewed.   Constitutional:       General: She is not in acute distress.     Appearance: She is well-developed. She is obese.   HENT:      Head: Normocephalic and atraumatic.   Eyes:      Conjunctiva/sclera: Conjunctivae normal.   Neck:      Thyroid: No thyromegaly.   Pulmonary:      Effort: Pulmonary effort is normal. No respiratory distress.   Skin:     Findings: No rash (visible).   Neurological:      Mental Status: She is alert and oriented to person, place, and time.   Psychiatric:        "  Mood and Affect: Mood normal.         Behavior: Behavior normal.

## 2024-08-17 DIAGNOSIS — E66.09 CLASS 1 OBESITY DUE TO EXCESS CALORIES WITHOUT SERIOUS COMORBIDITY WITH BODY MASS INDEX (BMI) OF 33.0 TO 33.9 IN ADULT: ICD-10-CM

## 2024-08-19 DIAGNOSIS — E66.09 CLASS 1 OBESITY DUE TO EXCESS CALORIES WITHOUT SERIOUS COMORBIDITY WITH BODY MASS INDEX (BMI) OF 33.0 TO 33.9 IN ADULT: ICD-10-CM

## 2024-08-19 RX ORDER — TIRZEPATIDE 12.5 MG/.5ML
12.5 INJECTION, SOLUTION SUBCUTANEOUS WEEKLY
Qty: 2 ML | Refills: 1 | Status: SHIPPED | OUTPATIENT
Start: 2024-08-19 | End: 2024-08-21

## 2024-08-19 RX ORDER — TIRZEPATIDE 12.5 MG/.5ML
12.5 INJECTION, SOLUTION SUBCUTANEOUS WEEKLY
Qty: 2 ML | Refills: 1 | Status: SHIPPED | OUTPATIENT
Start: 2024-08-19 | End: 2024-08-19 | Stop reason: SDUPTHER

## 2024-08-21 DIAGNOSIS — E66.09 CLASS 1 OBESITY DUE TO EXCESS CALORIES WITHOUT SERIOUS COMORBIDITY WITH BODY MASS INDEX (BMI) OF 33.0 TO 33.9 IN ADULT: Primary | ICD-10-CM

## 2024-08-21 RX ORDER — TIRZEPATIDE 15 MG/.5ML
15 INJECTION, SOLUTION SUBCUTANEOUS WEEKLY
Qty: 2 ML | Refills: 2 | Status: SHIPPED | OUTPATIENT
Start: 2024-08-21 | End: 2024-11-13

## 2024-08-26 DIAGNOSIS — G47.00 INSOMNIA, UNSPECIFIED TYPE: ICD-10-CM

## 2024-08-28 RX ORDER — TRAZODONE HYDROCHLORIDE 100 MG/1
TABLET ORAL
Qty: 90 TABLET | Refills: 3 | Status: SHIPPED | OUTPATIENT
Start: 2024-08-28

## 2024-10-21 DIAGNOSIS — E66.811 CLASS 1 OBESITY DUE TO EXCESS CALORIES WITHOUT SERIOUS COMORBIDITY WITH BODY MASS INDEX (BMI) OF 33.0 TO 33.9 IN ADULT: Primary | ICD-10-CM

## 2024-10-21 DIAGNOSIS — E66.09 CLASS 1 OBESITY DUE TO EXCESS CALORIES WITHOUT SERIOUS COMORBIDITY WITH BODY MASS INDEX (BMI) OF 33.0 TO 33.9 IN ADULT: Primary | ICD-10-CM

## 2024-10-21 RX ORDER — TIRZEPATIDE 12.5 MG/.5ML
12.5 INJECTION, SOLUTION SUBCUTANEOUS WEEKLY
Qty: 2 ML | Refills: 0 | Status: SHIPPED | OUTPATIENT
Start: 2024-10-21 | End: 2024-11-18

## 2024-10-31 ENCOUNTER — CLINICAL SUPPORT (OUTPATIENT)
Dept: BARIATRICS | Facility: CLINIC | Age: 54
End: 2024-10-31

## 2024-10-31 VITALS
SYSTOLIC BLOOD PRESSURE: 110 MMHG | BODY MASS INDEX: 26.66 KG/M2 | DIASTOLIC BLOOD PRESSURE: 78 MMHG | RESPIRATION RATE: 16 BRPM | HEIGHT: 61 IN | HEART RATE: 82 BPM | TEMPERATURE: 97.6 F | WEIGHT: 141.2 LBS

## 2024-10-31 DIAGNOSIS — R63.5 ABNORMAL WEIGHT GAIN: Primary | ICD-10-CM

## 2024-10-31 PROCEDURE — RECHECK

## 2024-10-31 NOTE — PROGRESS NOTES
Patient last visit weight: 155lbs   Patient current visit weight: 141.2lbs     If you are taking phentermine or other oral weight loss medications, are you experiencing any of the following symptoms:  Headache:   Blurred Vision:   Chest Pain:   Palpitations:  Insomnia:   SPECIFY ORAL MEDICATION AND DOSAGE:     If you are taking an injectable medication,  are you experiencing any of the following symptoms:  Bloating: NO   Nausea: NO   Vomiting: NO   Constipation: NO    Diarrhea: NO   SPECIFY INJECTABLE MEDICATION AND CURRENT DOSAGE: Zepbound 15mg. Patient tolerating well.       Vitals:    Is BP less than 100/60? NO   Is BP greater than 140/90? NO   Is HR greater than 100? NO   **If yes to any of the above, have patient relax and repeat in 5-10 minutes**    Repeat values:    Is BP less than 100/60?  Is BP greater than 140/90?  Is HR greater than 100?  **If values remain outside of ranges above, please consult provider for next steps**

## 2024-11-01 DIAGNOSIS — E66.811 CLASS 1 OBESITY DUE TO EXCESS CALORIES WITHOUT SERIOUS COMORBIDITY WITH BODY MASS INDEX (BMI) OF 33.0 TO 33.9 IN ADULT: Primary | ICD-10-CM

## 2024-11-01 DIAGNOSIS — E66.09 CLASS 1 OBESITY DUE TO EXCESS CALORIES WITHOUT SERIOUS COMORBIDITY WITH BODY MASS INDEX (BMI) OF 33.0 TO 33.9 IN ADULT: Primary | ICD-10-CM

## 2024-11-01 RX ORDER — TIRZEPATIDE 15 MG/.5ML
15 INJECTION, SOLUTION SUBCUTANEOUS WEEKLY
Qty: 2 ML | Refills: 4 | Status: SHIPPED | OUTPATIENT
Start: 2024-11-01 | End: 2025-01-24

## 2024-11-05 ENCOUNTER — ANNUAL EXAM (OUTPATIENT)
Dept: GYNECOLOGY | Facility: CLINIC | Age: 54
End: 2024-11-05
Payer: COMMERCIAL

## 2024-11-05 VITALS
HEIGHT: 61 IN | BODY MASS INDEX: 26.51 KG/M2 | DIASTOLIC BLOOD PRESSURE: 72 MMHG | SYSTOLIC BLOOD PRESSURE: 114 MMHG | WEIGHT: 140.4 LBS

## 2024-11-05 DIAGNOSIS — N95.2 VAGINAL ATROPHY: ICD-10-CM

## 2024-11-05 DIAGNOSIS — Z01.419 WOMEN'S ANNUAL ROUTINE GYNECOLOGICAL EXAMINATION: Primary | ICD-10-CM

## 2024-11-05 DIAGNOSIS — Z12.31 ENCOUNTER FOR SCREENING MAMMOGRAM FOR BREAST CANCER: ICD-10-CM

## 2024-11-05 DIAGNOSIS — D25.9 UTERINE LEIOMYOMA, UNSPECIFIED LOCATION: ICD-10-CM

## 2024-11-05 PROCEDURE — S0612 ANNUAL GYNECOLOGICAL EXAMINA: HCPCS | Performed by: OBSTETRICS & GYNECOLOGY

## 2024-11-05 RX ORDER — NITROFURANTOIN 25; 75 MG/1; MG/1
CAPSULE ORAL
COMMUNITY
Start: 2024-11-04 | End: 2024-11-13

## 2024-11-05 NOTE — PROGRESS NOTES
Assessment & Plan   Diagnoses and all orders for this visit:    Women's annual routine gynecological examination    Encounter for screening mammogram for breast cancer  -     Mammo screening bilateral w 3d and cad; Future    Vaginal atrophy    Uterine leiomyoma, unspecified location    Other orders  -     Ibuprofen (MOTRIN PO)  -     Multiple Vitamin (MULTIVITAMIN ADULT PO);  (Patient not taking: Reported on 11/5/2024)  -     nitrofurantoin (MACROBID) 100 mg capsule    1.  Yearly exam-Pap smear deferred, self breast awareness reviewed, calcium/vitamin D recommendations discussed, mammogram request given, colonoscopy up-to-date follow-up as per specialist.  2. history uterine myoma-September 2017 ultrasound with 5.2 cm myoma noted.  Uterus was previously 8 weeks, now 6 weeks size.  She denies symptoms.  No intervention is recommended.  3. history of menopause-last menses 9/21/2019.  Denies any significant menopause symptoms.  To call return with any issues.  4.  Mild vaginal atrophy-noted on exam.  She denies any dyspareunia.  Vaginal lubrication/moisturizer sheet given, to use as needed.  5. prior history of irregular bleeding-resolved after D&C 2016 with polypectomy.  6. other-daughter delivered through our practice.  Burt is almost 5 years old.  Younger daughter with Nexplanon and birth control pill, followed by me and has an appointment in 2 weeks time.  She continues to work at [employer], has been there for 33 years.  Follow-up 1 year for yearly exam or as needed.    Subjective   Patient ID: Lizy Peterson is a 54 y.o. female.    Vitals:    11/05/24 1558   BP: 114/72     Patient was seen today for yearly exam.  Please see assessment plan for details.      The following portions of the patient's history were reviewed and updated as appropriate: allergies, current medications, past family history, past medical history, past social history, past surgical history, and problem list.  Past Medical History:   Diagnosis Date     Abnormal uterine bleeding (AUB)     spotting between periods    Anxiety     Depression     history of depression    Dysmenorrhea     Fibroid 10/17/2019    Insomnia     Recurrent pregnancy loss, antepartum condition or complication     Tubal pregnancy     Uterine polyp     Wears glasses      Past Surgical History:   Procedure Laterality Date    AUGMENTATION MAMMAPLASTY Bilateral 2009    BREAST SURGERY      biopsy; implants     SECTION      EYE SURGERY Right     lazy eye correction     HEMORROIDECTOMY      OOPHORECTOMY Left     AL ARTHROSCOPY ELBOW SURGICAL DEBRIDEMENT EXTENSIVE Right 10/17/2016    Procedure: ARTHROSCOPY ELBOW WITH LATERAL EPICONDYLE DEBRIDMENT; ECRB RELEASE;  Surgeon: Landry Fernando MD;  Location[facility];  Service: Orthopedics    AL HYSTEROSCOPY BX ENDOMETRIUM&/POLYPC W/WO D&C N/A 2016    Procedure: DILATATION AND CURETTAGE (D&C) WITH HYSTEROSCOPY POLYPECTOMY;  Surgeon: Eric Yuen MD;  Location: [facility];  Service: Gynecology    WISDOM TOOTH EXTRACTION       OB History    Para Term  AB Living   4 2 2   2 2   SAB IAB Ectopic Multiple Live Births           2      # Outcome Date GA Lbr Chris/2nd Weight Sex Type Anes PTL Lv   4 AB            3 AB            2 Term            1 Term                Current Outpatient Medications:     Alcohol Swabs (Pharmacist Choice Alcohol) PADS, AS DIRECTED, Disp: 100 each, Rfl: 0    fluticasone (FLONASE) 50 mcg/act nasal spray, SPRAY 1 SPRAY INTO EACH NOSTRIL TWO TIMES A DAY AS NEEDED (Patient taking differently: as needed), Disp: 48 mL, Rfl: 2    Ibuprofen (MOTRIN PO), , Disp: , Rfl:     LORazepam (ATIVAN) 1 mg tablet, Take 0.5 tablets (0.5 mg total) by mouth daily as needed for anxiety Avoid driving/working while taking due to sedation (Patient taking differently: Take 0.5 mg by mouth as needed for anxiety Avoid driving/working while taking due to sedation), Disp: 30 tablet, Rfl: 0    Multiple Vitamin (MULTI VITAMIN DAILY PO), Take 3  tablets by mouth daily gummies, Disp: , Rfl:     nitrofurantoin (MACROBID) 100 mg capsule, , Disp: , Rfl:     rosuvastatin (CRESTOR) 20 MG tablet, Take 1 tablet (20 mg total) by mouth daily, Disp: 90 tablet, Rfl: 1    tirzepatide (Zepbound) 15 mg/0.5 mL auto-injector, Inject 0.5 mL (15 mg total) under the skin once a week, Disp: 2 mL, Rfl: 4    traZODone (DESYREL) 100 mg tablet, Take 1.5 tabs po daily (for a total of 150 mg daily), Disp: 90 tablet, Rfl: 3    Multiple Vitamin (MULTIVITAMIN ADULT PO), , Disp: , Rfl:     tirzepatide (Zepbound) 12.5 mg/0.5 mL auto-injector, Inject 0.5 mL (12.5 mg total) under the skin once a week for 28 days (Patient not taking: Reported on 11/5/2024), Disp: 2 mL, Rfl: 0  Allergies   Allergen Reactions    Amoxicillin Abdominal Pain    Augmentin [Amoxicillin-Pot Clavulanate] GI Intolerance     md harris's use of cefazolin for 0R on 10/17/16.( Dr. Fernando)    Clavulanic Acid Abdominal Pain    Phenobarbital Other (See Comments) and Delirium     hallucinations     Social History     Socioeconomic History    Marital status: /Civil Union     Spouse name: None    Number of children: None    Years of education: None    Highest education level: None   Occupational History    None   Tobacco Use    Smoking status: Every Day     Current packs/day: 1.00     Average packs/day: 1 pack/day for 4.0 years (4.0 ttl pk-yrs)     Types: Cigarettes    Smokeless tobacco: Never    Tobacco comments:     quit for 20yrs but started smoking again 4yr ago   Vaping Use    Vaping status: Never Used   Substance and Sexual Activity    Alcohol use: Yes     Comment: socially    Drug use: No    Sexual activity: Yes     Birth control/protection: None, Male Sterilization   Other Topics Concern    None   Social History Narrative    None     Social Determinants of Health     Financial Resource Strain: Not on file   Food Insecurity: Not on file   Transportation Needs: Not on file   Physical Activity: Not on file   Stress:  "Not on file   Social Connections: Not on file   Intimate Partner Violence: Not on file   Housing Stability: Not on file     Family History   Problem Relation Age of Onset    Hypertension Mother     Rheum arthritis Father     Hypertension Sister     No Known Problems Daughter     No Known Problems Daughter     Lung cancer Maternal Grandmother 80    No Known Problems Maternal Grandfather     Breast cancer Paternal Grandmother 30    No Known Problems Paternal Grandfather     Cancer Maternal Aunt 45    No Known Problems Maternal Aunt        Review of Systems   Constitutional:  Negative for chills, diaphoresis, fatigue and fever.   Respiratory:  Negative for apnea, cough, chest tightness, shortness of breath and wheezing.    Cardiovascular:  Negative for chest pain, palpitations and leg swelling.   Gastrointestinal:  Negative for abdominal distention, abdominal pain, anal bleeding, constipation, diarrhea, nausea, rectal pain and vomiting.   Genitourinary:  Negative for difficulty urinating, dyspareunia, dysuria, frequency, hematuria, menstrual problem, pelvic pain, urgency, vaginal bleeding, vaginal discharge and vaginal pain.   Musculoskeletal:  Negative for arthralgias, back pain and myalgias.   Skin:  Negative for color change and rash.   Neurological:  Negative for dizziness, syncope, light-headedness, numbness and headaches.   Hematological:  Negative for adenopathy. Does not bruise/bleed easily.   Psychiatric/Behavioral:  Negative for dysphoric mood and sleep disturbance. The patient is not nervous/anxious.        Objective   Physical Exam  OBGyn Exam     Objective      /72 (BP Location: Right arm, Patient Position: Sitting)   Ht 5' 1\" (1.549 m)   Wt 63.7 kg (140 lb 6.4 oz)   LMP  (LMP Unknown)   BMI 26.53 kg/m²     General:   alert and oriented, in no acute distress   Neck: normal to inspection and palpation   Breast: normal appearance, no masses or tenderness   Heart:    Lungs:    Abdomen: soft, " non-tender, without masses or organomegaly   Vulva: normal   Vagina: Minimally atrophic, without erythema or lesions or discharge   Cervix: Minimally atrophic, without lesions or discharge or cervicitis.  No CMT.   Uterus: mid-position, non-tender, size consistent with 6 weeks   Adnexa: no mass, fullness, tenderness   Rectum: negative    Psych:  Normal mood and affect   Skin:  Without obvious lesions   Eyes: symmetric, with normal movements and reactivity   Musculoskeletal:  Normal muscle tone and movements appreciated

## 2024-11-13 ENCOUNTER — OFFICE VISIT (OUTPATIENT)
Dept: FAMILY MEDICINE CLINIC | Facility: CLINIC | Age: 54
End: 2024-11-13
Payer: COMMERCIAL

## 2024-11-13 VITALS
TEMPERATURE: 97.2 F | HEIGHT: 61 IN | BODY MASS INDEX: 26.53 KG/M2 | SYSTOLIC BLOOD PRESSURE: 110 MMHG | DIASTOLIC BLOOD PRESSURE: 72 MMHG | OXYGEN SATURATION: 100 % | HEART RATE: 74 BPM | RESPIRATION RATE: 16 BRPM

## 2024-11-13 DIAGNOSIS — F41.9 ANXIETY: ICD-10-CM

## 2024-11-13 DIAGNOSIS — K21.9 GASTROESOPHAGEAL REFLUX DISEASE WITHOUT ESOPHAGITIS: ICD-10-CM

## 2024-11-13 DIAGNOSIS — Z23 ENCOUNTER FOR IMMUNIZATION: ICD-10-CM

## 2024-11-13 DIAGNOSIS — Z00.00 PHYSICAL EXAM: Primary | ICD-10-CM

## 2024-11-13 DIAGNOSIS — Z87.891 PERSONAL HISTORY OF TOBACCO USE, PRESENTING HAZARDS TO HEALTH: ICD-10-CM

## 2024-11-13 PROCEDURE — 90673 RIV3 VACCINE NO PRESERV IM: CPT

## 2024-11-13 PROCEDURE — 99396 PREV VISIT EST AGE 40-64: CPT | Performed by: FAMILY MEDICINE

## 2024-11-13 PROCEDURE — 99214 OFFICE O/P EST MOD 30 MIN: CPT | Performed by: FAMILY MEDICINE

## 2024-11-13 PROCEDURE — 90471 IMMUNIZATION ADMIN: CPT

## 2024-11-13 RX ORDER — NICOTINE 21 MG/24HR
1 PATCH, TRANSDERMAL 24 HOURS TRANSDERMAL EVERY 24 HOURS
Qty: 28 PATCH | Refills: 1 | Status: SHIPPED | OUTPATIENT
Start: 2024-11-13

## 2024-11-13 NOTE — PROGRESS NOTES
Name: Lizy Peterson      : 1970      MRN: [id number]  Encounter Provider: Virginia Flores DO  Encounter Date: 2024   Encounter department: [facility]  :  Assessment & Plan  Encounter for immunization    Orders:    influenza vaccine, recombinant, PF, 0.5 mL IM (Flublok)    Physical exam  Anticipatory guidance and preventative medicine discussed  Has had good weight loss  Doing some yoga  Discussed importance of building muscle mass  Pt does some weights as well.  Goes to gyn  Mammo and colonoscopy up to date  Flu shot today    Goes to eye dr and dentist.  No need to repeat labs at this time  Normal cmp and lipids in 2024         Gastroesophageal reflux disease without esophagitis  No symptoms with weight loss.         Personal history of tobacco use, presenting hazards to health  Reminded pt to get CT scan chest  Urge cessation  Pt is still smoking  Had picked up rx for nicoderm in past but did not use  And are now   Requesting new rx.      Orders:    CT lung screening program; Future    nicotine (NICODERM CQ) 21 mg/24 hr TD 24 hr patch; Place 1 patch on the skin over 24 hours every 24 hours For 6 weeks, then call for next dose    Anxiety  Trazodone is working well - up to 150 mg nightly         Patient Instructions   Consider a dietician as a bridge to stopping med          History of Present Illness   HPI  Lizy Peterson is a 54 y.o. female who presents presents today for routine PE  Lost about 40 # on zepbound   Set to be finished with med by end of year due to insurance      Review of Systems   Constitutional:  Positive for unexpected weight change. Negative for chills, diaphoresis and fever.   Eyes:  Negative for visual disturbance.   Respiratory:  Negative for cough, shortness of breath and wheezing.    Cardiovascular:  Negative for chest pain and palpitations.   Genitourinary:  Negative for dysuria and vaginal bleeding.        Had recent uti   No vaginal  "bleeding     Neurological:  Negative for dizziness, syncope and headaches.   Psychiatric/Behavioral:  Negative for dysphoric mood, self-injury, sleep disturbance and suicidal ideas. The patient is not nervous/anxious.           Objective   /72   Pulse 74   Temp (!) 97.2 °F (36.2 °C) (Tympanic)   Resp 16   Ht 5' 1\" (1.549 m)   LMP  (LMP Unknown)   SpO2 100%   BMI 26.53 kg/m²      Physical Exam  Vitals and nursing note reviewed.   Constitutional:       General: She is not in acute distress.     Appearance: Normal appearance. She is not ill-appearing or toxic-appearing.   HENT:      Right Ear: Tympanic membrane normal.      Left Ear: Tympanic membrane normal.      Mouth/Throat:      Mouth: Mucous membranes are moist.      Pharynx: No oropharyngeal exudate or posterior oropharyngeal erythema.   Eyes:      General: No scleral icterus.  Neck:      Vascular: No carotid bruit.   Cardiovascular:      Rate and Rhythm: Normal rate and regular rhythm.      Pulses: Normal pulses.      Heart sounds: Normal heart sounds. No murmur heard.  Pulmonary:      Effort: Pulmonary effort is normal. No respiratory distress.      Breath sounds: Normal breath sounds. No wheezing, rhonchi or rales.   Abdominal:      General: There is no distension.      Palpations: Abdomen is soft. There is no mass.      Tenderness: There is no abdominal tenderness. There is no guarding or rebound.   Musculoskeletal:      Cervical back: Neck supple. No tenderness.      Right lower leg: No edema.      Left lower leg: No edema.   Lymphadenopathy:      Cervical: No cervical adenopathy.   Skin:     General: Skin is warm.      Coloration: Skin is not jaundiced.   Neurological:      General: No focal deficit present.      Mental Status: She is alert and oriented to person, place, and time.   Psychiatric:         Mood and Affect: Mood normal.         Behavior: Behavior normal.         Thought Content: Thought content normal.         Judgment: Judgment " normal.

## 2024-11-13 NOTE — ASSESSMENT & PLAN NOTE
Trazodone is working well - up to 150 mg nightly         Patient Instructions   Consider a dietician as a bridge to stopping med

## 2024-11-13 NOTE — ASSESSMENT & PLAN NOTE
Reminded pt to get CT scan chest  Urge cessation  Pt is still smoking  Had picked up rx for nicoderm in past but did not use  And are now   Requesting new rx.      Orders:    CT lung screening program; Future    nicotine (NICODERM CQ) 21 mg/24 hr TD 24 hr patch; Place 1 patch on the skin over 24 hours every 24 hours For 6 weeks, then call for next dose

## 2024-12-03 DIAGNOSIS — E78.5 HYPERLIPIDEMIA, UNSPECIFIED HYPERLIPIDEMIA TYPE: ICD-10-CM

## 2024-12-04 DIAGNOSIS — E66.09 CLASS 1 OBESITY DUE TO EXCESS CALORIES WITHOUT SERIOUS COMORBIDITY WITH BODY MASS INDEX (BMI) OF 33.0 TO 33.9 IN ADULT: Primary | ICD-10-CM

## 2024-12-04 DIAGNOSIS — E66.811 CLASS 1 OBESITY DUE TO EXCESS CALORIES WITHOUT SERIOUS COMORBIDITY WITH BODY MASS INDEX (BMI) OF 33.0 TO 33.9 IN ADULT: Primary | ICD-10-CM

## 2024-12-04 RX ORDER — TIRZEPATIDE 15 MG/.5ML
15 INJECTION, SOLUTION SUBCUTANEOUS WEEKLY
Qty: 9 ML | Refills: 0 | Status: SHIPPED | OUTPATIENT
Start: 2024-12-04 | End: 2025-02-26

## 2024-12-04 RX ORDER — ROSUVASTATIN CALCIUM 20 MG/1
20 TABLET, COATED ORAL DAILY
Qty: 90 TABLET | Refills: 1 | Status: SHIPPED | OUTPATIENT
Start: 2024-12-04

## 2024-12-10 ENCOUNTER — TELEPHONE (OUTPATIENT)
Dept: BARIATRICS | Facility: CLINIC | Age: 54
End: 2024-12-10

## 2024-12-10 NOTE — TELEPHONE ENCOUNTER
PA for Zepbound 15mg APPROVED     Date(s) approved 11/10/2024 - 12/10/2025    Case #    Patient advised by          []Proxamahart Message  []Phone call   [x]LMOM  []L/M to call office as no active Communication consent on file  []Unable to leave detailed message as VM not approved on Communication consent       Pharmacy advised by    [x]Fax  []Phone call    Approval letter scanned into Media No - from covermymeds

## 2024-12-10 NOTE — TELEPHONE ENCOUNTER
PA for Zepbound 15mg SUBMITTED to [employer]     via    [x]CMM-KEY: [id number]  []Surescripts-Case ID #   []Availity-Auth ID # NDC #   []Faxed to plan   []Other website   []Phone call Case ID #     []PA sent as URGENT    All office notes, labs and other pertaining documents and studies sent. Clinical questions answered. Awaiting determination from insurance company.     Turnaround time for your insurance to make a decision on your Prior Authorization can take 7-21 business days.

## 2024-12-17 DIAGNOSIS — E66.811 CLASS 1 OBESITY DUE TO EXCESS CALORIES WITHOUT SERIOUS COMORBIDITY WITH BODY MASS INDEX (BMI) OF 33.0 TO 33.9 IN ADULT: ICD-10-CM

## 2024-12-17 DIAGNOSIS — E66.09 CLASS 1 OBESITY DUE TO EXCESS CALORIES WITHOUT SERIOUS COMORBIDITY WITH BODY MASS INDEX (BMI) OF 33.0 TO 33.9 IN ADULT: ICD-10-CM

## 2024-12-17 RX ORDER — TIRZEPATIDE 15 MG/.5ML
15 INJECTION, SOLUTION SUBCUTANEOUS WEEKLY
Qty: 2 ML | Refills: 0 | Status: SHIPPED | OUTPATIENT
Start: 2024-12-17 | End: 2025-03-11

## 2025-01-13 DIAGNOSIS — E66.811 CLASS 1 OBESITY DUE TO EXCESS CALORIES WITHOUT SERIOUS COMORBIDITY WITH BODY MASS INDEX (BMI) OF 33.0 TO 33.9 IN ADULT: ICD-10-CM

## 2025-01-13 DIAGNOSIS — E66.09 CLASS 1 OBESITY DUE TO EXCESS CALORIES WITHOUT SERIOUS COMORBIDITY WITH BODY MASS INDEX (BMI) OF 33.0 TO 33.9 IN ADULT: ICD-10-CM

## 2025-01-13 RX ORDER — TIRZEPATIDE 15 MG/.5ML
15 INJECTION, SOLUTION SUBCUTANEOUS WEEKLY
Qty: 2 ML | Refills: 1 | Status: SHIPPED | OUTPATIENT
Start: 2025-01-13 | End: 2025-04-07

## 2025-01-22 ENCOUNTER — HOSPITAL ENCOUNTER (OUTPATIENT)
Dept: MAMMOGRAPHY | Facility: MEDICAL CENTER | Age: 55
Discharge: HOME | End: 2025-01-22
Payer: COMMERCIAL

## 2025-01-22 VITALS — BODY MASS INDEX: 23.79 KG/M2 | HEIGHT: 61 IN | WEIGHT: 126 LBS

## 2025-01-22 DIAGNOSIS — Z12.31 ENCOUNTER FOR SCREENING MAMMOGRAM FOR BREAST CANCER: ICD-10-CM

## 2025-01-22 PROCEDURE — 77067 SCR MAMMO BI INCL CAD: CPT

## 2025-01-22 PROCEDURE — 77063 BREAST TOMOSYNTHESIS BI: CPT

## 2025-01-30 ENCOUNTER — RESULTS FOLLOW-UP (OUTPATIENT)
Dept: GYNECOLOGY | Facility: CLINIC | Age: 55
End: 2025-01-30

## 2025-01-30 DIAGNOSIS — R92.30 INCONCLUSIVE MAMMOGRAM DUE TO DENSE BREASTS: Primary | ICD-10-CM

## 2025-01-30 DIAGNOSIS — R92.2 INCONCLUSIVE MAMMOGRAM DUE TO DENSE BREASTS: Primary | ICD-10-CM

## 2025-01-30 DIAGNOSIS — Z12.39 SCREENING FOR BREAST CANCER USING NON-MAMMOGRAM MODALITY: ICD-10-CM

## 2025-02-10 DIAGNOSIS — E66.811 CLASS 1 OBESITY DUE TO EXCESS CALORIES WITHOUT SERIOUS COMORBIDITY WITH BODY MASS INDEX (BMI) OF 33.0 TO 33.9 IN ADULT: ICD-10-CM

## 2025-02-10 DIAGNOSIS — E66.09 CLASS 1 OBESITY DUE TO EXCESS CALORIES WITHOUT SERIOUS COMORBIDITY WITH BODY MASS INDEX (BMI) OF 33.0 TO 33.9 IN ADULT: ICD-10-CM

## 2025-02-10 RX ORDER — TIRZEPATIDE 15 MG/.5ML
15 INJECTION, SOLUTION SUBCUTANEOUS WEEKLY
Qty: 2 ML | Refills: 5 | Status: SHIPPED | OUTPATIENT
Start: 2025-02-10 | End: 2025-02-18

## 2025-02-18 ENCOUNTER — OFFICE VISIT (OUTPATIENT)
Dept: BARIATRICS | Facility: CLINIC | Age: 55
End: 2025-02-18
Payer: COMMERCIAL

## 2025-02-18 VITALS
HEART RATE: 84 BPM | SYSTOLIC BLOOD PRESSURE: 120 MMHG | TEMPERATURE: 97.6 F | HEIGHT: 61 IN | BODY MASS INDEX: 23.75 KG/M2 | DIASTOLIC BLOOD PRESSURE: 72 MMHG | RESPIRATION RATE: 16 BRPM | WEIGHT: 125.8 LBS

## 2025-02-18 DIAGNOSIS — F41.9 ANXIETY: ICD-10-CM

## 2025-02-18 DIAGNOSIS — Z86.39 HISTORY OF OBESITY: Primary | ICD-10-CM

## 2025-02-18 DIAGNOSIS — E78.2 MIXED HYPERLIPIDEMIA: ICD-10-CM

## 2025-02-18 PROCEDURE — 99214 OFFICE O/P EST MOD 30 MIN: CPT | Performed by: PHYSICIAN ASSISTANT

## 2025-02-18 RX ORDER — NALTREXONE HYDROCHLORIDE 50 MG/1
TABLET, FILM COATED ORAL
Qty: 90 TABLET | Refills: 1 | Status: SHIPPED | OUTPATIENT
Start: 2025-02-18

## 2025-02-18 RX ORDER — NALTREXONE HYDROCHLORIDE 50 MG/1
TABLET, FILM COATED ORAL
Qty: 30 TABLET | Refills: 3 | Status: SHIPPED | OUTPATIENT
Start: 2025-02-18 | End: 2025-02-18 | Stop reason: SDUPTHER

## 2025-02-18 RX ORDER — BUPROPION HYDROCHLORIDE 150 MG/1
150 TABLET ORAL DAILY
Qty: 90 TABLET | Refills: 1 | Status: SHIPPED | OUTPATIENT
Start: 2025-02-18

## 2025-02-18 RX ORDER — BUPROPION HYDROCHLORIDE 150 MG/1
150 TABLET ORAL DAILY
Qty: 30 TABLET | Refills: 3 | Status: SHIPPED | OUTPATIENT
Start: 2025-02-18 | End: 2025-02-18 | Stop reason: SDUPTHER

## 2025-02-18 NOTE — PROGRESS NOTES
Assessment/Plan:    Anxiety  On trazodone. Adding on wellbutrin for weight loss/prevent regain and discussed possible mood change/insomnia side effects    Hyperlipidemia  Has improved with weight loss and no longer taking statin    History of obesity    -Patient is pursuing Conservative Program  -Initial weight loss goal of 5-10% weight loss for improved health  -Screening labs and records reviewed from 10/17/23 with [facility]  - STOP BANG-1/8    Goals:  -continue 3 well rounded meals a day , making sure to get protein. Would food log for a week and then try to mimic current eating pattern in the future.  - To drink at least 64oz of water daily.No sugary beverages.  -continue exercise routine.     To discontinue on zepbound because of insurnace coverage.  . 30% weight loss and tolerated the medicine well . To start generics of contrave to help prevent weight regain.  Side effects discussed  Medication agreement signed 3/28/24      Initial Weight:179.6  Last Visit:155.6  Current Weight: 125.8  Change: -53.8lb  Goal Weight:140-MET and happy with current weight.            Return in about 6 months (around 8/18/2025) for 2 month nurse visit.       Diagnoses and all orders for this visit:    History of obesity  -     Discontinue: buPROPion (Wellbutrin XL) 150 mg 24 hr tablet; Take 1 tablet (150 mg total) by mouth daily  -     Discontinue: naltrexone (REVIA) 50 mg tablet; Take 1/2 tablet a day for 1 week and then take 1 tablet daily. Start after bupropion  -     naltrexone (REVIA) 50 mg tablet; Take 1/2 tablet a day for 1 week and then take 1 tablet daily. Start after bupropion  -     buPROPion (Wellbutrin XL) 150 mg 24 hr tablet; Take 1 tablet (150 mg total) by mouth daily    Mixed hyperlipidemia  -     Discontinue: buPROPion (Wellbutrin XL) 150 mg 24 hr tablet; Take 1 tablet (150 mg total) by mouth daily  -     Discontinue: naltrexone (REVIA) 50 mg tablet; Take 1/2 tablet a day for 1 week and then take 1 tablet daily. Start  after bupropion  -     naltrexone (REVIA) 50 mg tablet; Take 1/2 tablet a day for 1 week and then take 1 tablet daily. Start after bupropion  -     buPROPion (Wellbutrin XL) 150 mg 24 hr tablet; Take 1 tablet (150 mg total) by mouth daily    Anxiety  -     buPROPion (Wellbutrin XL) 150 mg 24 hr tablet; Take 1 tablet (150 mg total) by mouth daily          Subjective:   Chief Complaint   Patient presents with    Follow-up     Mwm f/u 6m: waist: 29in        Patient ID: Lizy Peterson  is a 54 y.o. female with excess weight/obesity here to pursue weight managment.  Patient is pursuing Conservative Program.     HPI  Currently on 15mg zepbound and tolerating well. At goal weight . Insurance is no longer covering the medication though. She is interested in starting contrave generics to help with regain and appetite control. Has started more exericse  Wt Readings from Last 10 Encounters:   02/18/25 57.1 kg (125 lb 12.8 oz)   01/22/25 57.2 kg (126 lb)   11/05/24 63.7 kg (140 lb 6.4 oz)   10/31/24 64 kg (141 lb 3.2 oz)   07/31/24 70.6 kg (155 lb 9.6 oz)   07/09/24 72.6 kg (160 lb)   06/20/24 74.7 kg (164 lb 9.6 oz)   05/09/24 76.2 kg (168 lb)   03/28/24 81.5 kg (179 lb 9.6 oz)   11/13/23 79.8 kg (175 lb 14.8 oz)       Food logging:  Increased appetite/cravings:  Exercise:yoga and arm exercises  Hydration:water at least 60 oz     Diet Recall:    The following portions of the patient's history were reviewed and updated as appropriate: She  has a past medical history of Abnormal uterine bleeding (AUB), Anxiety, Depression, Dysmenorrhea, Fibroid (10/17/2019), Insomnia, Recurrent pregnancy loss, antepartum condition or complication, Tubal pregnancy, Uterine polyp, and Wears glasses.  She   Patient Active Problem List    Diagnosis Date Noted    Personal history of tobacco use, presenting hazards to health 07/09/2024    Gastroesophageal reflux disease without esophagitis 07/09/2024    Chronic bilateral low back pain without sciatica  2020    Vaginal atrophy 10/22/2020    Menopause 10/22/2020    History of obesity 2020    Bilateral foot pain 2019    Uterine fibroid 10/17/2019    Irregular bleeding 10/17/2019    Notalgia paresthetica 07/10/2018    Hyperlipidemia 2017    Lateral epicondylitis of right elbow 08/10/2015    Anxiety 10/25/2013    Insomnia 10/02/2012     She  has a past surgical history that includes Breast surgery; Eye surgery (Right);  section; Hemorroidectomy; [place] tooth extraction; pr hysteroscopy bx endometrium&/polypc w/wo d&c (N/A, 2016); pr arthroscopy elbow surgical debridement extensive (Right, 10/17/2016); Augmentation mammaplasty (Bilateral, ); and Oophorectomy (Left).  Her family history includes Breast cancer (age of onset: 30) in her paternal grandmother; Cancer (age of onset: 45) in her maternal aunt; Hypertension in her mother and sister; Lung cancer (age of onset: 80) in her maternal grandmother; No Known Problems in her daughter, daughter, maternal aunt, maternal grandfather, and paternal grandfather; Rheum arthritis in her father.  She  reports that she has been smoking cigarettes. She has a 4 pack-year smoking history. She has never used smokeless tobacco. She reports current alcohol use. She reports that she does not use drugs.  Current Outpatient Medications   Medication Sig Dispense Refill    buPROPion (Wellbutrin XL) 150 mg 24 hr tablet Take 1 tablet (150 mg total) by mouth daily 90 tablet 1    fluticasone (FLONASE) 50 mcg/act nasal spray SPRAY 1 SPRAY INTO EACH NOSTRIL TWO TIMES A DAY AS NEEDED 48 mL 2    Ibuprofen (MOTRIN PO)       LORazepam (ATIVAN) 1 mg tablet Take 0.5 tablets (0.5 mg total) by mouth daily as needed for anxiety Avoid driving/working while taking due to sedation 30 tablet 0    Multiple Vitamin (MULTI VITAMIN DAILY PO) Take 3 tablets by mouth daily gummies      naltrexone (REVIA) 50 mg tablet Take 1/2 tablet a day for 1 week and then take 1 tablet  "daily. Start after bupropion 90 tablet 1    nicotine (NICODERM CQ) 21 mg/24 hr TD 24 hr patch Place 1 patch on the skin over 24 hours every 24 hours For 6 weeks, then call for next dose 28 patch 1    rosuvastatin (CRESTOR) 20 MG tablet TAKE 1 TABLET DAILY 90 tablet 1    traZODone (DESYREL) 100 mg tablet Take 1.5 tabs po daily (for a total of 150 mg daily) 90 tablet 3     No current facility-administered medications for this visit.     Current Outpatient Medications on File Prior to Visit   Medication Sig    fluticasone (FLONASE) 50 mcg/act nasal spray SPRAY 1 SPRAY INTO EACH NOSTRIL TWO TIMES A DAY AS NEEDED    Ibuprofen (MOTRIN PO)     LORazepam (ATIVAN) 1 mg tablet Take 0.5 tablets (0.5 mg total) by mouth daily as needed for anxiety Avoid driving/working while taking due to sedation    Multiple Vitamin (MULTI VITAMIN DAILY PO) Take 3 tablets by mouth daily gummies    nicotine (NICODERM CQ) 21 mg/24 hr TD 24 hr patch Place 1 patch on the skin over 24 hours every 24 hours For 6 weeks, then call for next dose    rosuvastatin (CRESTOR) 20 MG tablet TAKE 1 TABLET DAILY    traZODone (DESYREL) 100 mg tablet Take 1.5 tabs po daily (for a total of 150 mg daily)     No current facility-administered medications on file prior to visit.     She is allergic to amoxicillin, augmentin [amoxicillin-pot clavulanate], clavulanic acid, and phenobarbital..    Review of Systems   Constitutional:  Negative for fever.   Respiratory:  Negative for shortness of breath.    Cardiovascular:  Negative for chest pain and palpitations.   Gastrointestinal:  Negative for abdominal pain, constipation, diarrhea and vomiting.   Genitourinary:  Negative for difficulty urinating.   Skin:  Negative for rash.   Neurological:  Negative for headaches.   Psychiatric/Behavioral:  Negative for dysphoric mood. The patient is not nervous/anxious.        Objective:    /72   Pulse 84   Temp 97.6 °F (36.4 °C)   Resp 16   Ht 5' 1\" (1.549 m)   Wt 57.1 kg " (125 lb 12.8 oz)   LMP  (LMP Unknown)   BMI 23.77 kg/m²      Physical Exam  Vitals and nursing note reviewed.   Constitutional:       General: She is not in acute distress.     Appearance: Normal appearance. She is well-developed.   HENT:      Head: Normocephalic and atraumatic.   Eyes:      Conjunctiva/sclera: Conjunctivae normal.   Neck:      Thyroid: No thyromegaly.   Pulmonary:      Effort: Pulmonary effort is normal. No respiratory distress.   Skin:     Findings: No rash (visible).   Neurological:      Mental Status: She is alert and oriented to person, place, and time.   Psychiatric:         Mood and Affect: Mood normal.         Behavior: Behavior normal.

## 2025-02-19 NOTE — ASSESSMENT & PLAN NOTE
On trazodone. Adding on wellbutrin for weight loss/prevent regain and discussed possible mood change/insomnia side effects

## 2025-02-19 NOTE — ASSESSMENT & PLAN NOTE
-Patient is pursuing Conservative Program  -Initial weight loss goal of 5-10% weight loss for improved health  -Screening labs and records reviewed from 10/17/23 with [facility]  - STOP BANG-1/8    Goals:  -continue 3 well rounded meals a day , making sure to get protein. Would food log for a week and then try to mimic current eating pattern in the future.  - To drink at least 64oz of water daily.No sugary beverages.  -continue exercise routine.     To discontinue on zepbound because of insurnace coverage.  . 30% weight loss and tolerated the medicine well . To start generics of contrave to help prevent weight regain.  Side effects discussed  Medication agreement signed 3/28/24      Initial Weight:179.6  Last Visit:155.6  Current Weight: 125.8  Change: -53.8lb  Goal Weight:140-MET and happy with current weight.

## 2025-04-03 DIAGNOSIS — G47.00 INSOMNIA, UNSPECIFIED TYPE: ICD-10-CM

## 2025-04-03 RX ORDER — TRAZODONE HYDROCHLORIDE 100 MG/1
TABLET ORAL
Qty: 90 TABLET | Refills: 1 | Status: SHIPPED | OUTPATIENT
Start: 2025-04-03

## 2025-04-10 ENCOUNTER — TELEPHONE (OUTPATIENT)
Dept: BARIATRICS | Facility: CLINIC | Age: 55
End: 2025-04-10

## 2025-07-09 ENCOUNTER — TELEPHONE (OUTPATIENT)
Dept: BARIATRICS | Facility: CLINIC | Age: 55
End: 2025-07-09

## 2025-07-09 NOTE — TELEPHONE ENCOUNTER
Lmsg for pt to call back ad schedule nurse visit to consider increase in wellbutrin   [FreeTextEntry3] : Nasal Endoscopy: moderate inferior turbinate hypertrophy right septal deviation and spurring scattered mucus middle meati clear

## 2025-08-01 DIAGNOSIS — G47.00 INSOMNIA, UNSPECIFIED TYPE: ICD-10-CM

## 2025-08-02 RX ORDER — TRAZODONE HYDROCHLORIDE 100 MG/1
TABLET ORAL
Qty: 90 TABLET | Refills: 1 | Status: SHIPPED | OUTPATIENT
Start: 2025-08-02

## 2025-08-17 DIAGNOSIS — E78.2 MIXED HYPERLIPIDEMIA: ICD-10-CM

## 2025-08-17 DIAGNOSIS — Z86.39 HISTORY OF OBESITY: ICD-10-CM

## 2025-08-17 DIAGNOSIS — F41.9 ANXIETY: ICD-10-CM

## 2025-08-18 ENCOUNTER — OFFICE VISIT (OUTPATIENT)
Dept: BARIATRICS | Facility: CLINIC | Age: 55
End: 2025-08-18
Payer: COMMERCIAL

## 2025-08-18 VITALS
WEIGHT: 130 LBS | HEIGHT: 61 IN | HEART RATE: 75 BPM | RESPIRATION RATE: 16 BRPM | DIASTOLIC BLOOD PRESSURE: 80 MMHG | TEMPERATURE: 98.6 F | BODY MASS INDEX: 24.55 KG/M2 | SYSTOLIC BLOOD PRESSURE: 118 MMHG

## 2025-08-18 DIAGNOSIS — E78.2 MIXED HYPERLIPIDEMIA: ICD-10-CM

## 2025-08-18 DIAGNOSIS — F41.9 ANXIETY: ICD-10-CM

## 2025-08-18 DIAGNOSIS — Z86.39 HISTORY OF OBESITY: Primary | ICD-10-CM

## 2025-08-18 PROCEDURE — 99214 OFFICE O/P EST MOD 30 MIN: CPT | Performed by: PHYSICIAN ASSISTANT

## 2025-08-18 RX ORDER — BUPROPION HYDROCHLORIDE 150 MG/1
150 TABLET ORAL DAILY
Qty: 90 TABLET | Refills: 1 | Status: SHIPPED | OUTPATIENT
Start: 2025-08-18

## 2025-08-18 RX ORDER — PHENTERMINE HYDROCHLORIDE 15 MG/1
15 CAPSULE ORAL EVERY MORNING
Qty: 30 CAPSULE | Refills: 0 | Status: SHIPPED | OUTPATIENT
Start: 2025-08-18

## 2025-08-18 RX ORDER — DOXYCYCLINE 100 MG/1
1 TABLET ORAL 2 TIMES DAILY
COMMUNITY
Start: 2025-05-31